# Patient Record
Sex: MALE | Race: OTHER | NOT HISPANIC OR LATINO
[De-identification: names, ages, dates, MRNs, and addresses within clinical notes are randomized per-mention and may not be internally consistent; named-entity substitution may affect disease eponyms.]

---

## 2019-08-22 PROBLEM — Z00.00 ENCOUNTER FOR PREVENTIVE HEALTH EXAMINATION: Status: ACTIVE | Noted: 2019-08-22

## 2019-09-17 ENCOUNTER — APPOINTMENT (OUTPATIENT)
Dept: PULMONOLOGY | Facility: CLINIC | Age: 64
End: 2019-09-17
Payer: COMMERCIAL

## 2019-09-17 DIAGNOSIS — E78.5 HYPERLIPIDEMIA, UNSPECIFIED: ICD-10-CM

## 2019-09-17 DIAGNOSIS — Z99.89 OBSTRUCTIVE SLEEP APNEA (ADULT) (PEDIATRIC): ICD-10-CM

## 2019-09-17 DIAGNOSIS — E03.9 HYPOTHYROIDISM, UNSPECIFIED: ICD-10-CM

## 2019-09-17 DIAGNOSIS — R06.83 SNORING: ICD-10-CM

## 2019-09-17 DIAGNOSIS — F51.04 PSYCHOPHYSIOLOGIC INSOMNIA: ICD-10-CM

## 2019-09-17 DIAGNOSIS — N40.0 BENIGN PROSTATIC HYPERPLASIA WITHOUT LOWER URINARY TRACT SYMPMS: ICD-10-CM

## 2019-09-17 DIAGNOSIS — G47.33 OBSTRUCTIVE SLEEP APNEA (ADULT) (PEDIATRIC): ICD-10-CM

## 2019-09-17 DIAGNOSIS — G47.26 CIRCADIAN RHYTHM SLEEP DISORDER, SHIFT WORK TYPE: ICD-10-CM

## 2019-09-17 LAB — POCT - HEMOGLOBIN (HGB), QUANTITATIVE, TRANSCUTANEOUS: 14.6

## 2019-09-17 PROCEDURE — 94750: CPT

## 2019-09-17 PROCEDURE — ZZZZZ: CPT

## 2019-09-17 PROCEDURE — 94729 DIFFUSING CAPACITY: CPT

## 2019-09-17 PROCEDURE — 88738 HGB QUANT TRANSCUTANEOUS: CPT

## 2019-09-17 PROCEDURE — 94060 EVALUATION OF WHEEZING: CPT

## 2019-09-17 PROCEDURE — 94726 PLETHYSMOGRAPHY LUNG VOLUMES: CPT

## 2019-09-17 PROCEDURE — 99204 OFFICE O/P NEW MOD 45 MIN: CPT | Mod: 25

## 2019-09-17 RX ORDER — ZALEPLON 10 MG/1
10 CAPSULE ORAL
Qty: 30 | Refills: 1 | Status: ACTIVE | COMMUNITY
Start: 2019-09-17 | End: 1900-01-01

## 2019-09-22 PROBLEM — G47.26 SHIFT WORK SLEEP DISORDER: Status: ACTIVE | Noted: 2019-09-22

## 2019-09-22 PROBLEM — N40.0 BPH (BENIGN PROSTATIC HYPERPLASIA): Status: ACTIVE | Noted: 2019-09-22

## 2019-09-22 PROBLEM — E78.5 HLD (HYPERLIPIDEMIA): Status: ACTIVE | Noted: 2019-09-22

## 2019-09-22 PROBLEM — E03.9 HYPOTHYROIDISM: Status: ACTIVE | Noted: 2019-09-22

## 2019-09-22 PROBLEM — F51.04 CHRONIC INSOMNIA: Status: ACTIVE | Noted: 2019-09-22

## 2019-09-22 RX ORDER — ALFUZOSIN HYDROCHLORIDE 10 MG/1
10 TABLET, EXTENDED RELEASE ORAL
Refills: 0 | Status: ACTIVE | COMMUNITY

## 2019-09-22 RX ORDER — ALPRAZOLAM 2 MG/1
TABLET ORAL
Refills: 0 | Status: ACTIVE | COMMUNITY

## 2019-09-22 RX ORDER — FINASTERIDE 5 MG/1
5 TABLET, FILM COATED ORAL
Refills: 0 | Status: ACTIVE | COMMUNITY

## 2019-09-22 RX ORDER — LEVOTHYROXINE SODIUM 125 MCG
125 TABLET ORAL
Refills: 0 | Status: ACTIVE | COMMUNITY

## 2019-09-22 RX ORDER — GABAPENTIN 100 MG/1
100 CAPSULE ORAL
Refills: 0 | Status: ACTIVE | COMMUNITY

## 2019-09-22 RX ORDER — GEMFIBROZIL 600 MG/1
600 TABLET, FILM COATED ORAL
Refills: 0 | Status: ACTIVE | COMMUNITY

## 2019-09-22 NOTE — HISTORY OF PRESENT ILLNESS
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 His sleep time shifts by 12 hours every week.\par He has been using medication to sleep but it is been not fully effective for him.\par He also feels he is awake on CPAP\par Xanax and alprazolam were somewhat effective for him.\par \par

## 2019-09-22 NOTE — ASSESSMENT
[FreeTextEntry1] : Sleep issues appear multifactorial including\par Sleep stage misperception\par Shift work\par Possible anxiety\par Medication dependence.\par I explained to the patient that I think he is making an unreasonable demands on his body. It is not reasonable to expect him to be able to shift his schedule so dramatically.\par The most I can offer him is short acting medications to initiate sleep. \par Longer acting medications will make it difficult for him to be alert at times when he needs to be to work.\par He needs to set reasonable goals about scheduling.\par Additional sleep testing will likely be helpful once Shift work issue has been addressed

## 2019-10-23 ENCOUNTER — RX RENEWAL (OUTPATIENT)
Age: 64
End: 2019-10-23

## 2019-10-23 RX ORDER — ZOLPIDEM TARTRATE 10 MG/1
10 TABLET ORAL
Qty: 30 | Refills: 2 | Status: ACTIVE | COMMUNITY
Start: 2019-10-23 | End: 1900-01-01

## 2020-10-11 ENCOUNTER — TRANSCRIPTION ENCOUNTER (OUTPATIENT)
Age: 65
End: 2020-10-11

## 2022-08-02 ENCOUNTER — NON-APPOINTMENT (OUTPATIENT)
Age: 67
End: 2022-08-02

## 2022-08-10 ENCOUNTER — APPOINTMENT (OUTPATIENT)
Dept: ORTHOPEDIC SURGERY | Facility: CLINIC | Age: 67
End: 2022-08-10

## 2022-08-10 VITALS — HEIGHT: 70 IN | WEIGHT: 297 LBS | BODY MASS INDEX: 42.52 KG/M2

## 2022-08-10 PROCEDURE — 73502 X-RAY EXAM HIP UNI 2-3 VIEWS: CPT | Mod: LT

## 2022-08-10 PROCEDURE — 99204 OFFICE O/P NEW MOD 45 MIN: CPT

## 2022-08-10 NOTE — DISCUSSION/SUMMARY
[de-identified] : Impression is difficult to determine the dominant etiology of patient's symptoms gluteus tendon lesion versus hip osteoarthritis.  Versus adductor origin lesion.\par Plan trial of x-ray guided Marcaine injection left hip before considering possible steroid injection gluteus tendon

## 2022-08-10 NOTE — PHYSICAL EXAM
[de-identified] : Constitutional:Well nourished , well developed and in no acute distress\par Psychiatric: Alert and oriented to time place and person.Appropriate affect \par Skin:Head, neck, arms and lower extremities:no lesions or discoloration\par HEENT: Normocephalic, EOM intact, Nasal septum midline,\par Respiratory: Unlabored respirations,no audible wheezing ,no tachypnea, no cyanosis\par Cardiovascular: no leg swelling  no ankle edema no JVD, pulse regular\par Vascular: no calf or thigh tenderness, \par Peripheral pulses; intact\par Lymphatics:No groin adenopathy,no lymphedema lower  or upper extremities\par Left hip tender greater trochanter Trendelenburg gait leg length equal passive motion pain-free flexion 110 internal 30 external 50 [de-identified] : X-ray AP pelvis left hip reveals superolateral joint space narrowing and cam lesions\par \par MRI left hip of May 31, 2022 reveals severe left hip arthrosis, diffuse tear labrum low-grade partial-thickness tear distal gluteus minimus tendon and left common adductor tendon origin

## 2022-08-10 NOTE — HISTORY OF PRESENT ILLNESS
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  4 to 5 months ago patient developed spontaneous onset of pain lateral aspect left hip medial aspect groin and in the mid buttock.  Groin pain is constant.  Most severe pain is in the lateral hip which is markedly aggravated by sitting when 5 minutes.  He is relatively less symptomatic when walking.  He is undergone orthopedic evaluation.  Prescribed anti-inflammatory medication did not relieve symptoms nor did a 6 session course of physical therapy.  He obtained some relief from use of Aleve and Biofreeze.  Is ambulating independently.

## 2022-08-24 ENCOUNTER — TRANSCRIPTION ENCOUNTER (OUTPATIENT)
Age: 67
End: 2022-08-24

## 2022-08-31 ENCOUNTER — APPOINTMENT (OUTPATIENT)
Dept: ORTHOPEDIC SURGERY | Facility: CLINIC | Age: 67
End: 2022-08-31

## 2022-08-31 DIAGNOSIS — M17.12 UNILATERAL PRIMARY OSTEOARTHRITIS, LEFT KNEE: ICD-10-CM

## 2022-08-31 PROCEDURE — 99213 OFFICE O/P EST LOW 20 MIN: CPT

## 2022-08-31 NOTE — HISTORY OF PRESENT ILLNESS
[de-identified] : Deepak is a 66 year old male who presents for follow up of left hip Marcaine injection under fluoroscopy. He had the injection 2 weeks ago .  Patient reports following injection left hip preinjection left hip groin lateral hip and left knee pain resolved completely

## 2022-08-31 NOTE — PHYSICAL EXAM
[de-identified] : Constitutional:Well nourished , well developed and in no acute distress\par Psychiatric: Alert and oriented to time place and person.Appropriate affect \par Skin:Head, neck, arms and lower extremities:no lesions or discoloration\par HEENT: Normocephalic, EOM intact, Nasal septum midline,\par Respiratory: Unlabored respirations,no audible wheezing ,no tachypnea, no cyanosis\par Cardiovascular: no leg swelling  no ankle edema no JVD, pulse regular\par Vascular: no calf or thigh tenderness, \par Peripheral pulses; intact\par Lymphatics:No groin adenopathy,no lymphedema lower  or upper extremities\par

## 2022-10-12 ENCOUNTER — APPOINTMENT (OUTPATIENT)
Dept: ORTHOPEDIC SURGERY | Facility: CLINIC | Age: 67
End: 2022-10-12

## 2022-10-12 DIAGNOSIS — M16.11 UNILATERAL PRIMARY OSTEOARTHRITIS, RIGHT HIP: ICD-10-CM

## 2022-10-12 PROCEDURE — 99214 OFFICE O/P EST MOD 30 MIN: CPT

## 2022-10-12 NOTE — HISTORY OF PRESENT ILLNESS
[de-identified] : Deepak is a 66 year old male who presents for follow up of left hip Marcaine injection under fluoroscopy. He had the injection 2 weeks ago .  Patient reports following injection left hip preinjection left hip groin lateral hip and left knee pain resolved completely.  He now complains of recurrent pain with symptoms provoked by sitting or walking more than 10 minutes.  Overall feels quality life is markedly compromised

## 2022-10-12 NOTE — DISCUSSION/SUMMARY
[de-identified] : Impression end-stage osteoarthritis left hip\par Recommendation given compromised quality life I recommended to patient and wife total hip replacement and reviewed risk benefits and alternatives

## 2022-10-12 NOTE — PHYSICAL EXAM
[de-identified] : Constitutional:Well nourished , well developed and in no acute distress\par Psychiatric: Alert and oriented to time place and person.Appropriate affect \par Skin:Head, neck, arms and lower extremities:no lesions or discoloration\par HEENT: Normocephalic, EOM intact, Nasal septum midline,\par Respiratory: Unlabored respirations,no audible wheezing ,no tachypnea, no cyanosis\par Cardiovascular: no leg swelling  no ankle edema no JVD, pulse regular\par Vascular: no calf or thigh tenderness, \par Peripheral pulses; intact\par Lymphatics:No groin adenopathy,no lymphedema lower  or upper extremities\par Left hip passive range of motion restricted and painful [de-identified] : X-ray AP pelvis left hip reveals superolateral joint space narrowing and cam lesions\par \par MRI left hip of May 31, 2022 reveals severe left hip arthrosis, diffuse tear labrum low-grade partial-thickness tear distal gluteus minimus tendon and left common adductor tendon origin

## 2022-11-29 ENCOUNTER — OUTPATIENT (OUTPATIENT)
Dept: OUTPATIENT SERVICES | Facility: HOSPITAL | Age: 67
LOS: 1 days | End: 2022-11-29
Payer: COMMERCIAL

## 2022-11-29 VITALS
DIASTOLIC BLOOD PRESSURE: 76 MMHG | TEMPERATURE: 98 F | HEART RATE: 60 BPM | RESPIRATION RATE: 14 BRPM | OXYGEN SATURATION: 99 % | SYSTOLIC BLOOD PRESSURE: 124 MMHG | HEIGHT: 70 IN | WEIGHT: 300.05 LBS

## 2022-11-29 DIAGNOSIS — M19.90 UNSPECIFIED OSTEOARTHRITIS, UNSPECIFIED SITE: ICD-10-CM

## 2022-11-29 DIAGNOSIS — M16.12 UNILATERAL PRIMARY OSTEOARTHRITIS, LEFT HIP: ICD-10-CM

## 2022-11-29 DIAGNOSIS — Z01.818 ENCOUNTER FOR OTHER PREPROCEDURAL EXAMINATION: ICD-10-CM

## 2022-11-29 LAB
ALBUMIN SERPL ELPH-MCNC: 3.5 G/DL — SIGNIFICANT CHANGE UP (ref 3.3–5)
ALLERGY+IMMUNOLOGY DIAG STUDY NOTE: SIGNIFICANT CHANGE UP
ALP SERPL-CCNC: 72 U/L — SIGNIFICANT CHANGE UP (ref 30–120)
ALT FLD-CCNC: 21 U/L DA — SIGNIFICANT CHANGE UP (ref 10–60)
ANION GAP SERPL CALC-SCNC: 8 MMOL/L — SIGNIFICANT CHANGE UP (ref 5–17)
APTT BLD: 35.9 SEC — HIGH (ref 27.5–35.5)
AST SERPL-CCNC: 13 U/L — SIGNIFICANT CHANGE UP (ref 10–40)
BILIRUB SERPL-MCNC: 0.4 MG/DL — SIGNIFICANT CHANGE UP (ref 0.2–1.2)
BLD GP AB SCN SERPL QL: SIGNIFICANT CHANGE UP
BUN SERPL-MCNC: 17 MG/DL — SIGNIFICANT CHANGE UP (ref 7–23)
CALCIUM SERPL-MCNC: 8.5 MG/DL — SIGNIFICANT CHANGE UP (ref 8.4–10.5)
CHLORIDE SERPL-SCNC: 107 MMOL/L — SIGNIFICANT CHANGE UP (ref 96–108)
CO2 SERPL-SCNC: 29 MMOL/L — SIGNIFICANT CHANGE UP (ref 22–31)
CREAT SERPL-MCNC: 0.96 MG/DL — SIGNIFICANT CHANGE UP (ref 0.5–1.3)
DAT IGG-SP REAG RBC-IMP: ABNORMAL
DIR ANTIGLOB POLYSPECIFIC INTERPRETATION: ABNORMAL
EGFR: 87 ML/MIN/1.73M2 — SIGNIFICANT CHANGE UP
GLUCOSE SERPL-MCNC: 110 MG/DL — HIGH (ref 70–99)
HCT VFR BLD CALC: 43.4 % — SIGNIFICANT CHANGE UP (ref 39–50)
HGB BLD-MCNC: 14.8 G/DL — SIGNIFICANT CHANGE UP (ref 13–17)
IAT COMP-SP REAG SERPL QL: SIGNIFICANT CHANGE UP
INR BLD: 1.12 RATIO — SIGNIFICANT CHANGE UP (ref 0.88–1.16)
MCHC RBC-ENTMCNC: 31.1 PG — SIGNIFICANT CHANGE UP (ref 27–34)
MCHC RBC-ENTMCNC: 34.1 GM/DL — SIGNIFICANT CHANGE UP (ref 32–36)
MCV RBC AUTO: 91.2 FL — SIGNIFICANT CHANGE UP (ref 80–100)
NRBC # BLD: 0 /100 WBCS — SIGNIFICANT CHANGE UP (ref 0–0)
PLATELET # BLD AUTO: 210 K/UL — SIGNIFICANT CHANGE UP (ref 150–400)
POTASSIUM SERPL-MCNC: 3.8 MMOL/L — SIGNIFICANT CHANGE UP (ref 3.5–5.3)
POTASSIUM SERPL-SCNC: 3.8 MMOL/L — SIGNIFICANT CHANGE UP (ref 3.5–5.3)
PROT SERPL-MCNC: 7.6 G/DL — SIGNIFICANT CHANGE UP (ref 6–8.3)
PROTHROM AB SERPL-ACNC: 12.9 SEC — SIGNIFICANT CHANGE UP (ref 10.5–13.4)
RBC # BLD: 4.76 M/UL — SIGNIFICANT CHANGE UP (ref 4.2–5.8)
RBC # FLD: 13.5 % — SIGNIFICANT CHANGE UP (ref 10.3–14.5)
SODIUM SERPL-SCNC: 144 MMOL/L — SIGNIFICANT CHANGE UP (ref 135–145)
WBC # BLD: 8.11 K/UL — SIGNIFICANT CHANGE UP (ref 3.8–10.5)
WBC # FLD AUTO: 8.11 K/UL — SIGNIFICANT CHANGE UP (ref 3.8–10.5)

## 2022-11-29 PROCEDURE — 86077 PHYS BLOOD BANK SERV XMATCH: CPT

## 2022-11-29 PROCEDURE — 93010 ELECTROCARDIOGRAM REPORT: CPT

## 2022-11-29 PROCEDURE — 93005 ELECTROCARDIOGRAM TRACING: CPT

## 2022-11-29 PROCEDURE — G0463: CPT

## 2022-11-29 NOTE — H&P PST ADULT - HISTORY OF PRESENT ILLNESS
This jannie 68 y/o male who presents with 9 month history of worsening left hip pain/groin  radiating to buttocks. and down to left knee . Reports constant dull sharp pain and increased pain when sitting, bending hip and knee  with pain scale 8/10 . Naproxen PRN for pain  scheduled for left hip replacement  on 12/13/22 This is a 66 y/o male who presents with 9 month history of worsening left hip /groin pain radiating to buttocks and down to left knee . Reports constant dull sharp pain and increased pain when sitting, bending hip and knee  with pain scale 8/10 . Naproxen PRN for pain  scheduled for left hip replacement  on 12/13/22

## 2022-11-29 NOTE — H&P PST ADULT - MUSCULOSKELETAL COMMENTS
left hip pain left hip tenderness left hip tenderness. pain with ROM left hip ; skin dry and intact , no lesions

## 2022-11-29 NOTE — H&P PST ADULT - PROBLEM SELECTOR PROBLEM 1
Breathing spontaneous and unlabored. Breath sounds clear and equal bilaterally with regular rhythm. Osteoarthritis

## 2022-11-29 NOTE — H&P PST ADULT - MUSCULOSKELETAL
no chest wall tenderness details… left hip/decreased ROM/decreased ROM due to pain/decreased strength

## 2022-11-29 NOTE — H&P PST ADULT - PROBLEM SELECTOR PLAN 1
scheduled for left hip replacement on 12/13/22   Will obtain medical , carda]=iac and pulmonary clearance scheduled for left hip replacement on 12/13/22   Will obtain medical , cardiac and pulmonary clearance  Pre op instructions on wash , and medications   COVID test on 12/11/22 at 8 am

## 2022-11-29 NOTE — H&P PST ADULT - NSICDXPASTMEDICALHX_GEN_ALL_CORE_FT
PAST MEDICAL HISTORY:  Atonic bladder self catheterize 3 x day    BPH (benign prostatic hyperplasia)     HLD (hyperlipidemia)     Hypothyroid     Insomnia     Neuropathy bilateral feet    PRATIMA on CPAP     Osteoarthritis of left hip      PAST MEDICAL HISTORY:  Atonic bladder self catheterize 3 x day    BPH (benign prostatic hyperplasia)     HLD (hyperlipidemia)     Hypothyroid     Insomnia     Morbid obesity with BMI of 40.0-44.9, adult BMI 43    Neuropathy bilateral feet    PRATIMA on CPAP     Osteoarthritis of left hip

## 2022-11-30 LAB
A1C WITH ESTIMATED AVERAGE GLUCOSE RESULT: 5.6 % — SIGNIFICANT CHANGE UP (ref 4–5.6)
ESTIMATED AVERAGE GLUCOSE: 114 MG/DL — SIGNIFICANT CHANGE UP (ref 68–114)
MRSA PCR RESULT.: SIGNIFICANT CHANGE UP
S AUREUS DNA NOSE QL NAA+PROBE: SIGNIFICANT CHANGE UP

## 2022-12-06 ENCOUNTER — APPOINTMENT (OUTPATIENT)
Dept: RADIATION ONCOLOGY | Facility: CLINIC | Age: 67
End: 2022-12-06

## 2022-12-06 ENCOUNTER — OUTPATIENT (OUTPATIENT)
Dept: OUTPATIENT SERVICES | Facility: HOSPITAL | Age: 67
LOS: 1 days | Discharge: HOME | End: 2022-12-06

## 2022-12-06 DIAGNOSIS — M16.12 UNILATERAL PRIMARY OSTEOARTHRITIS, LEFT HIP: ICD-10-CM

## 2022-12-06 PROBLEM — N40.0 BENIGN PROSTATIC HYPERPLASIA WITHOUT LOWER URINARY TRACT SYMPTOMS: Chronic | Status: ACTIVE | Noted: 2022-11-29

## 2022-12-06 PROBLEM — N31.2 FLACCID NEUROPATHIC BLADDER, NOT ELSEWHERE CLASSIFIED: Chronic | Status: ACTIVE | Noted: 2022-11-29

## 2022-12-06 PROBLEM — E78.5 HYPERLIPIDEMIA, UNSPECIFIED: Chronic | Status: ACTIVE | Noted: 2022-11-29

## 2022-12-06 PROBLEM — G47.00 INSOMNIA, UNSPECIFIED: Chronic | Status: ACTIVE | Noted: 2022-11-29

## 2022-12-06 PROBLEM — E66.01 MORBID (SEVERE) OBESITY DUE TO EXCESS CALORIES: Chronic | Status: ACTIVE | Noted: 2022-11-29

## 2022-12-06 PROBLEM — E03.9 HYPOTHYROIDISM, UNSPECIFIED: Chronic | Status: ACTIVE | Noted: 2022-11-29

## 2022-12-06 PROBLEM — G47.33 OBSTRUCTIVE SLEEP APNEA (ADULT) (PEDIATRIC): Chronic | Status: ACTIVE | Noted: 2022-11-29

## 2022-12-06 PROBLEM — G62.9 POLYNEUROPATHY, UNSPECIFIED: Chronic | Status: ACTIVE | Noted: 2022-11-29

## 2022-12-06 LAB — APTT BLD: 34.6 SEC — SIGNIFICANT CHANGE UP (ref 27.5–35.5)

## 2022-12-06 PROCEDURE — 99024 POSTOP FOLLOW-UP VISIT: CPT

## 2022-12-06 PROCEDURE — 77261 THER RADIOLOGY TX PLNG SMPL: CPT

## 2022-12-06 NOTE — DISEASE MANAGEMENT
[Clinical] : TNM Stage: c [N/A] : Currently not applicable [FreeTextEntry4] : n/a [TTNM] : - [NTNM] : - [MTNM] : -

## 2022-12-06 NOTE — PHYSICAL EXAM
[General Appearance - Well Developed] : well developed [General Appearance - Well Nourished] : well nourished [General Appearance - Alert] : alert [General Appearance - In No Acute Distress] : in no acute distress [Sclera] : the sclera and conjunctiva were normal [Extraocular Movements] : extraocular movements were intact [Outer Ear] : the ears and nose were normal in appearance [Hearing Threshold Finger Rub Not Chase] : hearing was normal [] : no respiratory distress [Respiration, Rhythm And Depth] : normal respiratory rhythm and effort [Exaggerated Use Of Accessory Muscles For Inspiration] : no accessory muscle use [No Focal Deficits] : no focal deficits [Oriented To Time, Place, And Person] : oriented to person, place, and time [Affect] : the affect was normal [Not Anxious] : not anxious [FreeTextEntry1] : limited; video telehealth

## 2022-12-06 NOTE — VITALS
[Maximal Pain Intensity: 6/10] : 6/10 [Least Pain Intensity: 2/10] : 2/10 [Pain Location: ___] : Pain Location: [unfilled] [80: Normal activity with effort; some signs or symptoms of disease.] : 80: Normal activity with effort; some signs or symptoms of disease.  [ECOG Performance Status: 1 - Restricted in physically strenuous activity but ambulatory and able to carry out work of a light or sedentary nature] : Performance Status: 1 - Restricted in physically strenuous activity but ambulatory and able to carry out work of a light or sedentary nature, e.g., light house work, office work

## 2022-12-06 NOTE — HISTORY OF PRESENT ILLNESS
[FreeTextEntry1] : Mr. Porter is a 67 year old male with primary osteoarthritis of the left hip, with recurrent pain provoked by sitting or walking more than 10 minutes, c/w end stage osteoarthritis, for which he is scheduled for left total hip replacement. \par \par MR left hip on 5/31/22 had shown severe left hip OA with labral tearing, full-thickness cartilage loss. \par He reported immediate resolution of the pain s/p marcaine injection under fluoroscopy 8/18/22, but this relief is transient. As such, he is proceeding with left total hip replacement. 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 Notes significant left hip pain that prevents him from getting around.

## 2022-12-06 NOTE — REASON FOR VISIT
[Consideration of Therapy for Benign Disease] : consideration of therapy for benign disease [Other: ___] : [unfilled] [Home] : at home, [unfilled] , at the time of the visit. [Medical Office: ([facility])___] : at the medical office located in  [Verbal consent obtained from patient] : the patient, [unfilled]

## 2022-12-07 ENCOUNTER — APPOINTMENT (OUTPATIENT)
Dept: ORTHOPEDIC SURGERY | Facility: CLINIC | Age: 67
End: 2022-12-07

## 2022-12-07 VITALS — BODY MASS INDEX: 42.95 KG/M2 | HEIGHT: 70 IN | WEIGHT: 300 LBS

## 2022-12-07 DIAGNOSIS — M16.12 UNILATERAL PRIMARY OSTEOARTHRITIS, LEFT HIP: ICD-10-CM

## 2022-12-07 PROCEDURE — 99213 OFFICE O/P EST LOW 20 MIN: CPT

## 2022-12-07 NOTE — DISCUSSION/SUMMARY
[de-identified] : Impression end-stage osteoarthritis left hip\par Recommendation given compromised quality life I recommended to patient and wife total hip replacement and reviewed risk benefits and alternatives

## 2022-12-07 NOTE — HISTORY OF PRESENT ILLNESS
[de-identified] : Deepak is a 67 year old male who presents for follow up of left hip pain. He had a marcaine injection under fluoroscopy in the end of September, which he stated helped the pain. However the pain returned and became constant. The pain is provoked by sitting or walking more than 10 minutes. He feels quality life is markedly compromised. He has opted for surgery, and is scheduled for Left anterior THR for 12/13/22. Today, he is here for pre-op visit and skin check.  \par

## 2022-12-07 NOTE — PHYSICAL EXAM
[de-identified] : Constitutional:Well nourished , well developed and in no acute distress\par Psychiatric: Alert and oriented to time place and person.Appropriate affect \par Skin:Head, neck, arms and lower extremities:no lesions or discoloration\par HEENT: Normocephalic, EOM intact, Nasal septum midline,\par Respiratory: Unlabored respirations,no audible wheezing ,no tachypnea, no cyanosis\par Cardiovascular: no leg swelling no ankle edema no JVD, pulse regular\par Vascular: no calf or thigh tenderness, \par Peripheral pulses; intact\par Lymphatics:No groin adenopathy,no lymphedema lower or upper extremities\par Left hip passive range of motion restricted and painful  Skin intact\par  [de-identified] : RX-ray AP pelvis left hip reveals superolateral joint space narrowing and cam lesions  MRI left hip of May 31, 2022 reveals severe left hip arthrosis, diffuse tear labrum low-grade partial-thickness tear distal gluteus minimus tendon and left common adductor tendon origin

## 2022-12-08 RX ORDER — OXYCODONE HYDROCHLORIDE 5 MG/1
5 TABLET ORAL
Refills: 0 | Status: DISCONTINUED | OUTPATIENT
Start: 2022-12-13 | End: 2022-12-14

## 2022-12-08 RX ORDER — MAGNESIUM HYDROXIDE 400 MG/1
30 TABLET, CHEWABLE ORAL DAILY
Refills: 0 | Status: DISCONTINUED | OUTPATIENT
Start: 2022-12-13 | End: 2022-12-27

## 2022-12-08 RX ORDER — OXYCODONE HYDROCHLORIDE 5 MG/1
10 TABLET ORAL
Refills: 0 | Status: DISCONTINUED | OUTPATIENT
Start: 2022-12-13 | End: 2022-12-13

## 2022-12-08 RX ORDER — ACETAMINOPHEN 500 MG
1000 TABLET ORAL EVERY 8 HOURS
Refills: 0 | Status: DISCONTINUED | OUTPATIENT
Start: 2022-12-13 | End: 2022-12-27

## 2022-12-08 RX ORDER — SENNA PLUS 8.6 MG/1
2 TABLET ORAL AT BEDTIME
Refills: 0 | Status: DISCONTINUED | OUTPATIENT
Start: 2022-12-13 | End: 2022-12-27

## 2022-12-08 RX ORDER — POLYETHYLENE GLYCOL 3350 17 G/17G
17 POWDER, FOR SOLUTION ORAL AT BEDTIME
Refills: 0 | Status: DISCONTINUED | OUTPATIENT
Start: 2022-12-13 | End: 2022-12-27

## 2022-12-08 RX ORDER — SODIUM CHLORIDE 9 MG/ML
1000 INJECTION, SOLUTION INTRAVENOUS
Refills: 0 | Status: DISCONTINUED | OUTPATIENT
Start: 2022-12-13 | End: 2022-12-27

## 2022-12-08 RX ORDER — SODIUM CHLORIDE 9 MG/ML
500 INJECTION INTRAMUSCULAR; INTRAVENOUS; SUBCUTANEOUS ONCE
Refills: 0 | Status: COMPLETED | OUTPATIENT
Start: 2022-12-13 | End: 2022-12-13

## 2022-12-08 RX ORDER — ONDANSETRON 8 MG/1
4 TABLET, FILM COATED ORAL EVERY 6 HOURS
Refills: 0 | Status: DISCONTINUED | OUTPATIENT
Start: 2022-12-13 | End: 2022-12-27

## 2022-12-08 RX ORDER — PANTOPRAZOLE SODIUM 20 MG/1
40 TABLET, DELAYED RELEASE ORAL
Refills: 0 | Status: DISCONTINUED | OUTPATIENT
Start: 2022-12-13 | End: 2022-12-27

## 2022-12-11 ENCOUNTER — OUTPATIENT (OUTPATIENT)
Dept: OUTPATIENT SERVICES | Facility: HOSPITAL | Age: 67
LOS: 1 days | End: 2022-12-11
Payer: COMMERCIAL

## 2022-12-11 DIAGNOSIS — Z20.828 CONTACT WITH AND (SUSPECTED) EXPOSURE TO OTHER VIRAL COMMUNICABLE DISEASES: ICD-10-CM

## 2022-12-11 LAB — SARS-COV-2 RNA SPEC QL NAA+PROBE: SIGNIFICANT CHANGE UP

## 2022-12-11 PROCEDURE — U0005: CPT

## 2022-12-11 PROCEDURE — U0003: CPT

## 2022-12-12 ENCOUNTER — TRANSCRIPTION ENCOUNTER (OUTPATIENT)
Age: 67
End: 2022-12-12

## 2022-12-12 ENCOUNTER — OUTPATIENT (OUTPATIENT)
Dept: OUTPATIENT SERVICES | Facility: HOSPITAL | Age: 67
LOS: 1 days | Discharge: HOME | End: 2022-12-12

## 2022-12-12 PROCEDURE — 77300 RADIATION THERAPY DOSE PLAN: CPT | Mod: 26

## 2022-12-12 PROCEDURE — 77431 RADIATION THERAPY MANAGEMENT: CPT

## 2022-12-12 PROCEDURE — 77280 THER RAD SIMULAJ FIELD SMPL: CPT | Mod: 26

## 2022-12-13 ENCOUNTER — TRANSCRIPTION ENCOUNTER (OUTPATIENT)
Age: 67
End: 2022-12-13

## 2022-12-13 ENCOUNTER — APPOINTMENT (OUTPATIENT)
Dept: ORTHOPEDIC SURGERY | Facility: HOSPITAL | Age: 67
End: 2022-12-13

## 2022-12-13 ENCOUNTER — OUTPATIENT (OUTPATIENT)
Dept: OUTPATIENT SERVICES | Facility: HOSPITAL | Age: 67
LOS: 1 days | End: 2022-12-13
Payer: COMMERCIAL

## 2022-12-13 VITALS
SYSTOLIC BLOOD PRESSURE: 140 MMHG | HEART RATE: 57 BPM | DIASTOLIC BLOOD PRESSURE: 81 MMHG | TEMPERATURE: 99 F | WEIGHT: 297.18 LBS | OXYGEN SATURATION: 96 % | RESPIRATION RATE: 20 BRPM | HEIGHT: 70 IN

## 2022-12-13 DIAGNOSIS — M16.12 UNILATERAL PRIMARY OSTEOARTHRITIS, LEFT HIP: ICD-10-CM

## 2022-12-13 LAB
ANION GAP SERPL CALC-SCNC: 10 MMOL/L — SIGNIFICANT CHANGE UP (ref 5–17)
BUN SERPL-MCNC: 16 MG/DL — SIGNIFICANT CHANGE UP (ref 7–23)
CALCIUM SERPL-MCNC: 7.2 MG/DL — LOW (ref 8.4–10.5)
CHLORIDE SERPL-SCNC: 106 MMOL/L — SIGNIFICANT CHANGE UP (ref 96–108)
CO2 SERPL-SCNC: 24 MMOL/L — SIGNIFICANT CHANGE UP (ref 22–31)
CREAT SERPL-MCNC: 1.24 MG/DL — SIGNIFICANT CHANGE UP (ref 0.5–1.3)
EGFR: 64 ML/MIN/1.73M2 — SIGNIFICANT CHANGE UP
GLUCOSE SERPL-MCNC: 170 MG/DL — HIGH (ref 70–99)
HCT VFR BLD CALC: 35.8 % — LOW (ref 39–50)
HGB BLD-MCNC: 12 G/DL — LOW (ref 13–17)
MCHC RBC-ENTMCNC: 31.3 PG — SIGNIFICANT CHANGE UP (ref 27–34)
MCHC RBC-ENTMCNC: 33.5 GM/DL — SIGNIFICANT CHANGE UP (ref 32–36)
MCV RBC AUTO: 93.2 FL — SIGNIFICANT CHANGE UP (ref 80–100)
NRBC # BLD: 0 /100 WBCS — SIGNIFICANT CHANGE UP (ref 0–0)
PLATELET # BLD AUTO: 184 K/UL — SIGNIFICANT CHANGE UP (ref 150–400)
POTASSIUM SERPL-MCNC: 4.7 MMOL/L — SIGNIFICANT CHANGE UP (ref 3.5–5.3)
POTASSIUM SERPL-SCNC: 4.7 MMOL/L — SIGNIFICANT CHANGE UP (ref 3.5–5.3)
RBC # BLD: 3.84 M/UL — LOW (ref 4.2–5.8)
RBC # FLD: 13.2 % — SIGNIFICANT CHANGE UP (ref 10.3–14.5)
SODIUM SERPL-SCNC: 140 MMOL/L — SIGNIFICANT CHANGE UP (ref 135–145)
WBC # BLD: 14.91 K/UL — HIGH (ref 3.8–10.5)
WBC # FLD AUTO: 14.91 K/UL — HIGH (ref 3.8–10.5)

## 2022-12-13 PROCEDURE — 99232 SBSQ HOSP IP/OBS MODERATE 35: CPT

## 2022-12-13 PROCEDURE — 27130 TOTAL HIP ARTHROPLASTY: CPT | Mod: LT

## 2022-12-13 DEVICE — LIGATING CLIPS WECK HORIZON MEDIUM (BLUE) 6: Type: IMPLANTABLE DEVICE | Site: LEFT | Status: FUNCTIONAL

## 2022-12-13 DEVICE — HEAD FEM CERAMIC 12/14 36MM PLUS 5: Type: IMPLANTABLE DEVICE | Site: LEFT | Status: FUNCTIONAL

## 2022-12-13 DEVICE — LIGATING CLIPS WECK HORIZON MEDIUM-LARGE (GREEN) 6: Type: IMPLANTABLE DEVICE | Site: LEFT | Status: FUNCTIONAL

## 2022-12-13 DEVICE — ROD REAMING W/BALL TIP 2.5X650MM STRL: Type: IMPLANTABLE DEVICE | Site: LEFT | Status: FUNCTIONAL

## 2022-12-13 DEVICE — LIGATING CLIPS WECK HORIZON LARGE (ORANGE) 6: Type: IMPLANTABLE DEVICE | Site: LEFT | Status: FUNCTIONAL

## 2022-12-13 DEVICE — SHELL ACET PINNACLE SECTOR W/ GRIPTION 56MM: Type: IMPLANTABLE DEVICE | Site: LEFT | Status: FUNCTIONAL

## 2022-12-13 DEVICE — PINNACLE ALTRX NEUTRAL36 X 56: Type: IMPLANTABLE DEVICE | Site: LEFT | Status: FUNCTIONAL

## 2022-12-13 DEVICE — BONE WAX 2.5GM: Type: IMPLANTABLE DEVICE | Site: LEFT | Status: FUNCTIONAL

## 2022-12-13 DEVICE — BLLN PTA ANGIOSCULPT 4X40: Type: IMPLANTABLE DEVICE | Site: LEFT | Status: FUNCTIONAL

## 2022-12-13 DEVICE — COIL NIT OCCLD PDA 6X5MM: Type: IMPLANTABLE DEVICE | Site: LEFT | Status: FUNCTIONAL

## 2022-12-13 DEVICE — IMPLANTABLE DEVICE: Type: IMPLANTABLE DEVICE | Site: LEFT | Status: FUNCTIONAL

## 2022-12-13 RX ORDER — TAMSULOSIN HYDROCHLORIDE 0.4 MG/1
0.8 CAPSULE ORAL DAILY
Refills: 0 | Status: DISCONTINUED | OUTPATIENT
Start: 2022-12-13 | End: 2022-12-27

## 2022-12-13 RX ORDER — ALFUZOSIN HYDROCHLORIDE 10 MG/1
1 TABLET, EXTENDED RELEASE ORAL
Qty: 0 | Refills: 0 | DISCHARGE

## 2022-12-13 RX ORDER — SENNA PLUS 8.6 MG/1
2 TABLET ORAL
Qty: 0 | Refills: 0 | DISCHARGE
Start: 2022-12-13

## 2022-12-13 RX ORDER — LEVOTHYROXINE SODIUM 125 MCG
1 TABLET ORAL
Qty: 0 | Refills: 0 | DISCHARGE

## 2022-12-13 RX ORDER — OMEPRAZOLE 10 MG/1
1 CAPSULE, DELAYED RELEASE ORAL
Qty: 30 | Refills: 1
Start: 2022-12-13 | End: 2023-02-10

## 2022-12-13 RX ORDER — TRANEXAMIC ACID 100 MG/ML
1000 INJECTION, SOLUTION INTRAVENOUS ONCE
Refills: 0 | Status: COMPLETED | OUTPATIENT
Start: 2022-12-13 | End: 2022-12-13

## 2022-12-13 RX ORDER — CHLORHEXIDINE GLUCONATE 213 G/1000ML
1 SOLUTION TOPICAL ONCE
Refills: 0 | Status: COMPLETED | OUTPATIENT
Start: 2022-12-13 | End: 2022-12-13

## 2022-12-13 RX ORDER — GABAPENTIN 400 MG/1
200 CAPSULE ORAL DAILY
Refills: 0 | Status: DISCONTINUED | OUTPATIENT
Start: 2022-12-13 | End: 2022-12-27

## 2022-12-13 RX ORDER — GABAPENTIN 400 MG/1
2 CAPSULE ORAL
Qty: 0 | Refills: 0 | DISCHARGE

## 2022-12-13 RX ORDER — OXYCODONE HYDROCHLORIDE 5 MG/1
5 TABLET ORAL ONCE
Refills: 0 | Status: DISCONTINUED | OUTPATIENT
Start: 2022-12-13 | End: 2022-12-13

## 2022-12-13 RX ORDER — ACETAMINOPHEN 500 MG
1000 TABLET ORAL
Qty: 0 | Refills: 0 | DISCHARGE
Start: 2022-12-13

## 2022-12-13 RX ORDER — FINASTERIDE 5 MG/1
1 TABLET, FILM COATED ORAL
Qty: 0 | Refills: 0 | DISCHARGE

## 2022-12-13 RX ORDER — FINASTERIDE 5 MG/1
5 TABLET, FILM COATED ORAL DAILY
Refills: 0 | Status: DISCONTINUED | OUTPATIENT
Start: 2022-12-13 | End: 2022-12-27

## 2022-12-13 RX ORDER — VANCOMYCIN HCL 1 G
2000 VIAL (EA) INTRAVENOUS ONCE
Refills: 0 | Status: DISCONTINUED | OUTPATIENT
Start: 2022-12-13 | End: 2022-12-13

## 2022-12-13 RX ORDER — POLYETHYLENE GLYCOL 3350 17 G/17G
17 POWDER, FOR SOLUTION ORAL
Qty: 0 | Refills: 0 | DISCHARGE
Start: 2022-12-13

## 2022-12-13 RX ORDER — ACETAMINOPHEN 500 MG
1000 TABLET ORAL ONCE
Refills: 0 | Status: COMPLETED | OUTPATIENT
Start: 2022-12-13 | End: 2022-12-13

## 2022-12-13 RX ORDER — ONDANSETRON 8 MG/1
4 TABLET, FILM COATED ORAL ONCE
Refills: 0 | Status: DISCONTINUED | OUTPATIENT
Start: 2022-12-13 | End: 2022-12-13

## 2022-12-13 RX ORDER — GABAPENTIN 400 MG/1
0 CAPSULE ORAL
Qty: 0 | Refills: 0 | DISCHARGE

## 2022-12-13 RX ORDER — CEFAZOLIN SODIUM 1 G
3000 VIAL (EA) INJECTION ONCE
Refills: 0 | Status: DISCONTINUED | OUTPATIENT
Start: 2022-12-13 | End: 2022-12-13

## 2022-12-13 RX ORDER — HYDROMORPHONE HYDROCHLORIDE 2 MG/ML
0.5 INJECTION INTRAMUSCULAR; INTRAVENOUS; SUBCUTANEOUS
Refills: 0 | Status: DISCONTINUED | OUTPATIENT
Start: 2022-12-13 | End: 2022-12-13

## 2022-12-13 RX ORDER — GABAPENTIN 400 MG/1
1 CAPSULE ORAL
Qty: 0 | Refills: 0 | DISCHARGE

## 2022-12-13 RX ORDER — LEVOTHYROXINE SODIUM 125 MCG
125 TABLET ORAL DAILY
Refills: 0 | Status: DISCONTINUED | OUTPATIENT
Start: 2022-12-13 | End: 2022-12-13

## 2022-12-13 RX ORDER — GEMFIBROZIL 600 MG
600 TABLET ORAL
Refills: 0 | Status: DISCONTINUED | OUTPATIENT
Start: 2022-12-13 | End: 2022-12-27

## 2022-12-13 RX ORDER — GABAPENTIN 400 MG/1
100 CAPSULE ORAL AT BEDTIME
Refills: 0 | Status: DISCONTINUED | OUTPATIENT
Start: 2022-12-13 | End: 2022-12-27

## 2022-12-13 RX ORDER — GEMFIBROZIL 600 MG
1 TABLET ORAL
Qty: 0 | Refills: 0 | DISCHARGE

## 2022-12-13 RX ORDER — LEVOTHYROXINE SODIUM 125 MCG
2 TABLET ORAL
Qty: 0 | Refills: 0 | DISCHARGE

## 2022-12-13 RX ORDER — CELECOXIB 200 MG/1
200 CAPSULE ORAL EVERY 12 HOURS
Refills: 0 | Status: DISCONTINUED | OUTPATIENT
Start: 2022-12-13 | End: 2022-12-27

## 2022-12-13 RX ORDER — VANCOMYCIN HCL 1 G
2000 VIAL (EA) INTRAVENOUS ONCE
Refills: 0 | Status: COMPLETED | OUTPATIENT
Start: 2022-12-13 | End: 2022-12-13

## 2022-12-13 RX ORDER — LEVOTHYROXINE SODIUM 125 MCG
125 TABLET ORAL DAILY
Refills: 0 | Status: DISCONTINUED | OUTPATIENT
Start: 2022-12-13 | End: 2022-12-27

## 2022-12-13 RX ORDER — DEXAMETHASONE 0.5 MG/5ML
8 ELIXIR ORAL ONCE
Refills: 0 | Status: COMPLETED | OUTPATIENT
Start: 2022-12-14 | End: 2022-12-14

## 2022-12-13 RX ORDER — ZOLPIDEM TARTRATE 10 MG/1
5 TABLET ORAL AT BEDTIME
Refills: 0 | Status: DISCONTINUED | OUTPATIENT
Start: 2022-12-13 | End: 2022-12-13

## 2022-12-13 RX ORDER — ASPIRIN/CALCIUM CARB/MAGNESIUM 324 MG
81 TABLET ORAL EVERY 12 HOURS
Refills: 0 | Status: DISCONTINUED | OUTPATIENT
Start: 2022-12-14 | End: 2022-12-27

## 2022-12-13 RX ORDER — NALOXONE HYDROCHLORIDE 4 MG/.1ML
4 SPRAY NASAL
Qty: 1 | Refills: 0
Start: 2022-12-13

## 2022-12-13 RX ORDER — SODIUM CHLORIDE 9 MG/ML
1000 INJECTION, SOLUTION INTRAVENOUS
Refills: 0 | Status: DISCONTINUED | OUTPATIENT
Start: 2022-12-13 | End: 2022-12-13

## 2022-12-13 RX ORDER — SODIUM CHLORIDE 9 MG/ML
500 INJECTION INTRAMUSCULAR; INTRAVENOUS; SUBCUTANEOUS ONCE
Refills: 0 | Status: COMPLETED | OUTPATIENT
Start: 2022-12-13 | End: 2022-12-13

## 2022-12-13 RX ORDER — TOBRAMYCIN 0.3 %
1 DROPS OPHTHALMIC (EYE) EVERY 8 HOURS
Refills: 0 | Status: COMPLETED | OUTPATIENT
Start: 2022-12-13 | End: 2022-12-14

## 2022-12-13 RX ORDER — ASPIRIN/CALCIUM CARB/MAGNESIUM 324 MG
1 TABLET ORAL
Qty: 56 | Refills: 0
Start: 2022-12-13 | End: 2023-01-09

## 2022-12-13 RX ORDER — CEFAZOLIN SODIUM 1 G
2000 VIAL (EA) INJECTION EVERY 8 HOURS
Refills: 0 | Status: COMPLETED | OUTPATIENT
Start: 2022-12-13 | End: 2022-12-14

## 2022-12-13 RX ORDER — APREPITANT 80 MG/1
40 CAPSULE ORAL ONCE
Refills: 0 | Status: COMPLETED | OUTPATIENT
Start: 2022-12-13 | End: 2022-12-13

## 2022-12-13 RX ORDER — CELECOXIB 200 MG/1
1 CAPSULE ORAL
Qty: 60 | Refills: 0
Start: 2022-12-13 | End: 2023-01-11

## 2022-12-13 RX ORDER — ESZOPICLONE 2 MG/1
1 TABLET, COATED ORAL
Qty: 0 | Refills: 0 | DISCHARGE

## 2022-12-13 RX ADMIN — Medication 1000 MILLIGRAM(S): at 21:21

## 2022-12-13 RX ADMIN — OXYCODONE HYDROCHLORIDE 5 MILLIGRAM(S): 5 TABLET ORAL at 23:15

## 2022-12-13 RX ADMIN — CHLORHEXIDINE GLUCONATE 1 APPLICATION(S): 213 SOLUTION TOPICAL at 07:36

## 2022-12-13 RX ADMIN — Medication 1000 MILLIGRAM(S): at 21:18

## 2022-12-13 RX ADMIN — CELECOXIB 200 MILLIGRAM(S): 200 CAPSULE ORAL at 21:21

## 2022-12-13 RX ADMIN — APREPITANT 40 MILLIGRAM(S): 80 CAPSULE ORAL at 07:36

## 2022-12-13 RX ADMIN — OXYCODONE HYDROCHLORIDE 5 MILLIGRAM(S): 5 TABLET ORAL at 17:34

## 2022-12-13 RX ADMIN — Medication 250 MILLIGRAM(S): at 07:54

## 2022-12-13 RX ADMIN — GABAPENTIN 100 MILLIGRAM(S): 400 CAPSULE ORAL at 21:20

## 2022-12-13 RX ADMIN — Medication 100 MILLIGRAM(S): at 16:20

## 2022-12-13 RX ADMIN — Medication 1 DROP(S): at 14:45

## 2022-12-13 RX ADMIN — SODIUM CHLORIDE 125 MILLILITER(S): 9 INJECTION, SOLUTION INTRAVENOUS at 23:17

## 2022-12-13 RX ADMIN — SODIUM CHLORIDE 125 MILLILITER(S): 9 INJECTION, SOLUTION INTRAVENOUS at 16:19

## 2022-12-13 RX ADMIN — Medication 1 DROP(S): at 17:35

## 2022-12-13 RX ADMIN — Medication 1 DROP(S): at 14:50

## 2022-12-13 RX ADMIN — SODIUM CHLORIDE 75 MILLILITER(S): 9 INJECTION, SOLUTION INTRAVENOUS at 14:35

## 2022-12-13 RX ADMIN — Medication 600 MILLIGRAM(S): at 17:34

## 2022-12-13 RX ADMIN — Medication 400 MILLIGRAM(S): at 15:27

## 2022-12-13 RX ADMIN — SODIUM CHLORIDE 500 MILLILITER(S): 9 INJECTION INTRAMUSCULAR; INTRAVENOUS; SUBCUTANEOUS at 11:30

## 2022-12-13 RX ADMIN — CELECOXIB 200 MILLIGRAM(S): 200 CAPSULE ORAL at 21:18

## 2022-12-13 RX ADMIN — SODIUM CHLORIDE 500 MILLILITER(S): 9 INJECTION INTRAMUSCULAR; INTRAVENOUS; SUBCUTANEOUS at 16:20

## 2022-12-13 NOTE — CONSULT NOTE ADULT - SUBJECTIVE AND OBJECTIVE BOX
HPI: 67M who presents with 9 month history of worsening left hip /groin pain radiating to buttocks and down to left knee . Reports constant dull sharp pain and increased pain when sitting, bending hip and knee  with pain scale 8/10 . Naproxen PRN for pain  scheduled for left hip replacement on 12/13/22    Patient has some b/l eye pain but not blurriness. No loss of vision. No chest pain or shortness of breath. No nausea, vomiting, or abdominal pain.     REVIEW OF SYSTEMS:  CONSTITUTIONAL: No fever, weight loss, or fatigue  EYES: No eye pain, visual disturbances, or discharge  ENMT:  No difficulty hearing, tinnitus, vertigo; No sinus or throat pain  NECK: No pain or stiffness  RESPIRATORY: No cough, wheezing, chills or hemoptysis; No shortness of breath  CARDIOVASCULAR: No chest pain, palpitations, dizziness, or leg swelling  GASTROINTESTINAL: No abdominal or epigastric pain. No nausea, vomiting, or hematemesis; No diarrhea or constipation. No melena or hematochezia.  GENITOURINARY: No dysuria, frequency, hematuria, or incontinence  NEUROLOGICAL: No headaches, memory loss, loss of strength, numbness, or tremors  MUSCULOSKELETAL: No muscle or back pain      PAST MEDICAL & SURGICAL HISTORY:  Hypothyroid      HLD (hyperlipidemia)      Osteoarthritis of left hip      Neuropathy  bilateral feet      PRATIMA on CPAP      Insomnia      Atonic bladder  self catheterize 3 x day      BPH (benign prostatic hyperplasia)      Morbid obesity with BMI of 40.0-44.9, adult  BMI 43          SOCIAL HISTORY:  Tobacco; EtOH; Illicit Drugs    Allergies    cefdinir (Hives; Rash)  methenamine (Hives; Rash)    Intolerances        MEDICATIONS  (STANDING):  artificial tears (preservative free) Ophthalmic Solution 1 Drop(s) Both EYES every 4 hours  lactated ringers. 1000 milliLiter(s) (75 mL/Hr) IV Continuous <Continuous>  sodium chloride 0.9% Bolus 500 milliLiter(s) IV Bolus once  tetracaine 0.5% Solution 1 Drop(s) Both EYES once  tobramycin 0.3% Ophthalmic Solution 1 Drop(s) Both EYES every 8 hours  vancomycin  IVPB 2000 milliGRAM(s) IV Intermittent once    MEDICATIONS  (PRN):  HYDROmorphone  Injectable 0.5 milliGRAM(s) IV Push every 10 minutes PRN Moderate Pain (4 - 6)  ondansetron Injectable 4 milliGRAM(s) IV Push once PRN Nausea and/or Vomiting  oxyCODONE    IR 5 milliGRAM(s) Oral once PRN Moderate Pain (4 - 6)      FAMILY HISTORY:      Vital Signs Last 24 Hrs  T(C): 37.6 (13 Dec 2022 11:42), Max: 37.6 (13 Dec 2022 11:42)  T(F): 99.6 (13 Dec 2022 11:42), Max: 99.6 (13 Dec 2022 11:42)  HR: 64 (13 Dec 2022 14:00) (57 - 68)  BP: 112/60 (13 Dec 2022 14:00) (101/56 - 140/81)  BP(mean): 71 (13 Dec 2022 14:00) (63 - 74)  RR: 15 (13 Dec 2022 14:00) (14 - 22)  SpO2: 99% (13 Dec 2022 14:00) (95% - 100%)    Parameters below as of 13 Dec 2022 11:42  Patient On (Oxygen Delivery Method): nasal cannula  O2 Flow (L/min): 3      PHYSICAL EXAM:    GENERAL: NAD, well-developed  HEAD:  Atraumatic, Normocephalic  EYES: EOMI, PERRLA, conjunctiva and sclera clear  ENMT: No tonsillar erythema, exudates, or enlargement; Moist mucous membranes, Good dentition, No lesions  NECK: Supple, No JVD, Normal thyroid  NERVOUS SYSTEM:  Alert & Oriented X3, Good concentration;  CHEST/LUNG: Clear to auscultation bilaterally; No rales, rhonchi, wheezing, or rubs  HEART: Regular rate and rhythm; No murmurs, rubs, or gallops  ABDOMEN: Soft, Nontender, Nondistended; Bowel sounds present  EXTREMITIES:  2+ Peripheral Pulses, No clubbing, cyanosis, or edema

## 2022-12-13 NOTE — DISCHARGE NOTE PROVIDER - PROVIDER TOKENS
PROVIDER:[TOKEN:[[id number]],SCHEDULEDAPPT:[12/28/2022],SCHEDULEDAPPTTIME:[09:30 AM],ESTABLISHEDPATIENT:[T]]

## 2022-12-13 NOTE — DISCHARGE NOTE PROVIDER - NSDCMRMEDTOKEN_GEN_ALL_CORE_FT
alfuzosin 10 mg oral tablet, extended release: 1 tab(s) orally once a day  eszopiclone 3 mg oral tablet: 1 tab(s) orally once a day (at bedtime)  finasteride 5 mg oral tablet: 1 tab(s) orally once a day  gabapentin 600 mg oral tablet: 2 tab(s) orally once a day in the morning  gabapentin 600 mg oral tablet: 1 tab(s) orally once a day at bedtime  gemfibrozil 600 mg oral tablet: 1 tab(s) orally 2 times a day  naproxen 500 mg oral tablet:   Synthroid 125 mcg (0.125 mg) oral tablet: 1 tab(s) orally once a day (in the morning)  Synthroid 125 mcg (0.125 mg) oral tablet: 1 tab(s) orally once a day (in the afternoon)   acetaminophen: 1000 milligram(s) orally every 8 hours  alfuzosin 10 mg oral tablet, extended release: 1 tab(s) orally once a day  aspirin 81 mg oral delayed release tablet: 1 tab(s) orally every 12 hours  celecoxib 200 mg oral capsule: 1 cap(s) orally every 12 hours.  take 2 hrs after ecotrin  eszopiclone 3 mg oral tablet: 1 tab(s) orally once a day (at bedtime)  finasteride 5 mg oral tablet: 1 tab(s) orally once a day  gabapentin 100 mg oral capsule: 2 cap(s) orally once a day in a.m.  gabapentin 100 mg oral capsule: 1 cap(s) orally once a day (at bedtime)  gemfibrozil 600 mg oral tablet: 1 tab(s) orally 2 times a day  Narcan 4 mg/0.1 mL nasal spray: 4 milligram(s) intranasally once in case of overdose.  may repeat x 1 if unsucessful  omeprazole 20 mg oral delayed release capsule: 1 cap(s) orally once a day  before breakfast   oxyCODONE 5 mg oral tablet: 1-2  tab(s) orally every 4 hours, As Needed -Moderate to severe Pain MDD:6   [id number]  polyethylene glycol 3350 oral powder for reconstitution: 17 gram(s) orally once a day (at bedtime)  senna leaf extract oral tablet: 2 tab(s) orally once a day (at bedtime)  Synthroid 125 mcg (0.125 mg) oral tablet: 1 tab(s) orally once a day (in the morning)  Synthroid 125 mcg (0.125 mg) oral tablet: 1 tab(s) orally once a day (in the afternoon)

## 2022-12-13 NOTE — DISCHARGE NOTE PROVIDER - HOSPITAL COURSE
The patient is a 67 y.o. male with severe osteoarthritis of the left hip.  He was evaluated by the PST dept at [facility] and obtained the appropriate medical clearances.  After admission on 12/13/22 and receiving pre-operative parenteral prophylactic antibiotics (ancef and vanco (MRSA+)), the patient  underwent an  uncomplicated left anterior SCARLETT by orthopedic surgeon Dr. Juarez.    A medical consultation from the Hospitalist service was obtained for post-operative medical co-management. Typical Physical & occupational therapy modalities post anterior SCARLETT were performed including ambulation training, range of motion, ADL's, and transfers. Ecotrin 81 mg every 12 hrs, along w/ bilat venodynes, was given for DVT prophylaxis (caprini 9).  The patient had a clean appearing surgical incision with no sign of surgical site infections and had a stable neuro / vascular exam of the operated extremity.  After progression of mobility guided by the PT/ OT staff,  the patient was felt to benefit from further rehabilitative care for restoration to level of function. This was felt to best be accomplished at  home.  Discharge and Orthopedic Care instructions were delineated in the Discharge Plan and reviewed with the patient. All medications were delineated in the medication reconciliation tool and key points were reviewed with the patient. They were deemed stable from an Orthopedic & medical standpoint for discharge today.  Upon completion of Home care he will be  following up with Dr. Juarez for office  follow up Orthopedic care.   The patient is a 67 y.o. male with severe osteoarthritis of the left hip.  He was evaluated by the PST dept at [facility] and obtained the appropriate medical clearances.  On the day prior to surgery, he received preop radiation therapy for heterotopic ossification prophylaxis.  After admission on 12/13/22 and receiving pre-operative parenteral prophylactic antibiotics (ancef and vanco), the patient  underwent an  uncomplicated left anterior SCARLETT by orthopedic surgeon Dr. Juarez.    A medical consultation from the Hospitalist service was obtained for post-operative medical co-management. Typical Physical & occupational therapy modalities post anterior SCARLETT were performed including ambulation training, range of motion, ADL's, and transfers. Ecotrin 81 mg every 12 hrs, along w/ bilat venodynes, was given for DVT prophylaxis (caprini 9).  The patient had a clean appearing surgical incision with no sign of surgical site infections and had a stable neuro / vascular exam of the operated extremity.  After progression of mobility guided by the PT/ OT staff,  the patient was felt to benefit from further rehabilitative care for restoration to level of function. This was felt to best be accomplished at  home.  Discharge and Orthopedic Care instructions were delineated in the Discharge Plan and reviewed with the patient. All medications were delineated in the medication reconciliation tool and key points were reviewed with the patient. They were deemed stable from an Orthopedic & medical standpoint for discharge today.  Upon completion of Home care he will be  following up with Dr. Juarez for office  follow up Orthopedic care.

## 2022-12-13 NOTE — ASU PATIENT PROFILE, ADULT - NSICDXPASTMEDICALHX_GEN_ALL_CORE_FT
PAST MEDICAL HISTORY:  Atonic bladder self catheterize 3 x day    BPH (benign prostatic hyperplasia)     HLD (hyperlipidemia)     Hypothyroid     Insomnia     Morbid obesity with BMI of 40.0-44.9, adult BMI 43    Neuropathy bilateral feet    PRATIMA on CPAP     Osteoarthritis of left hip

## 2022-12-13 NOTE — BRIEF OPERATIVE NOTE - ESTIMATED BLOOD LOSS
Encounter Date: 12/10/2020       History     Chief Complaint   Patient presents with    Otalgia     pt c/o right ear pain since last night, denies any hearing difficulty, denies any drainage from ear     20-year-old female presents to ED with complaint of 2 day onset right ear pain.  Patient states she has attempted to take Tylenol with no improvement.  Pain described as sharp, nonradiating, severely 8/10.  She denies ringing in the ears, loss of hearing, headaches, sore throat, runny nose or congestion, cough, shortness of breath, chest or abdominal pain, fevers, chills, nausea, vomiting.  She does admit to having COVID 3 weeks ago, but states her symptoms have since resolved.  She is eating and drinking well at home. No other acute complaints at this time.           Review of patient's allergies indicates:  No Known Allergies  No past medical history on file.  No past surgical history on file.  Family History   Problem Relation Age of Onset    Hypertension Mother     Hypertension Maternal Grandmother     Aneurysm Maternal Grandmother     Diabetes Maternal Grandmother     Breast cancer Maternal Grandmother      Social History     Tobacco Use    Smoking status: Never Smoker    Smokeless tobacco: Never Used   Substance Use Topics    Alcohol use: No     Frequency: Never    Drug use: No     Review of Systems   Constitutional: Negative for activity change, chills and fever.   HENT: Positive for ear pain. Negative for congestion, drooling, ear discharge, facial swelling, sinus pressure, sinus pain, sore throat and tinnitus.    Respiratory: Negative for cough and shortness of breath.    Cardiovascular: Negative for chest pain.   Gastrointestinal: Negative for abdominal pain, nausea and vomiting.   Neurological: Negative for headaches.       Physical Exam     Initial Vitals [12/10/20 1037]   BP Pulse Resp Temp SpO2   127/72 78 18 98.8 °F (37.1 °C) 97 %      MAP       --         Physical Exam    Nursing note and  vitals reviewed.  Constitutional: Vital signs are normal. She appears well-developed and well-nourished. She is cooperative.  Non-toxic appearance. She does not have a sickly appearance. She does not appear ill. No distress.   HENT:   Head: Normocephalic and atraumatic.   Nose: Nose normal. No rhinorrhea.   Mouth/Throat: Uvula is midline and oropharynx is clear and moist.   Right ear external ear normal with no tragus tenderness.  Canal is clean with no erythema or swelling.  TM intact, noted to have bulge with fluid collection.  Left ear external ear normal.  Canal normal.  TM intact and normal.   Eyes: Conjunctivae and EOM are normal.   Neck: Normal range of motion. Neck supple.   Cardiovascular: Normal rate.   Pulmonary/Chest: Effort normal.   Abdominal: Soft. Normal appearance.   Musculoskeletal: No tenderness.   Neurological: She is alert and oriented to person, place, and time. GCS eye subscore is 4. GCS verbal subscore is 5. GCS motor subscore is 6.   Skin: Skin is warm and dry.   Psychiatric: She has a normal mood and affect. Her speech is normal and behavior is normal. Thought content normal.         ED Course   Procedures  Labs Reviewed   POCT URINE PREGNANCY          Imaging Results    None          Medical Decision Making:   Differential Diagnosis:   Otitis media, otitis externa, URI, allergy/irritant, viral  ED Management:  Patient presents with 2 day onset right ear pain.  No associated fevers, chills, sore throat, or other URI like symptoms.  She does admit to having COVID 3 weeks ago, but states her symptoms have since improved.  On exam, right TM bulging noted with fluid collection.  Findings concerning for acute otitis media.  She will be prescribed amoxicillin, encouraged to alternate Tylenol/ibuprofen as needed, close follow-up with PCP.  ED return precautions discussed.  Patient and mother understand instructions and agrees with plan.                             Clinical Impression:      ICD-10-CM ICD-9-CM   1. Acute otitis media, unspecified otitis media type  H66.90 382.9                          ED Disposition Condition    Discharge Stable        ED Prescriptions     Medication Sig Dispense Start Date End Date Auth. Provider    amoxicillin (AMOXIL) 500 MG capsule Take 1 capsule (500 mg total) by mouth every 8 (eight) hours. for 7 days 21 capsule 12/10/2020 12/17/2020 Francisco Floyd PA-C        Follow-up Information     Follow up With Specialties Details Why Contact Info    Dio Man, DO Family Medicine Call  As needed [address] [facility] [address]  [phone]                                         Francisco Floyd PA-C  12/10/20 1300     1000

## 2022-12-13 NOTE — DISCHARGE NOTE PROVIDER - NSDCCPCAREPLAN_GEN_ALL_CORE_FT
PRINCIPAL DISCHARGE DIAGNOSIS  Diagnosis: Primary osteoarthritis of left hip  Assessment and Plan of Treatment: left anterior SCARLETT  Physical Therapy/Occupational Therapy for: Ambulation, transfers, stairs, & ADLs, isometrics.   Full weight bearing on both legs; Walker/cane use as instructed by Physical therapy/Occupational therapy. Anterior Total Hip Replacement precautions for  6 weeks: No straight leg raise; No external rotation of hip when extended-standing or lying flat; No hyperextension of hip when standing (kickback).   Ice packs to hip for 30 min. every 3 hours and after physical therapy.   Keep incision clean and dry. You have a silverlon dressing. It is water resistant and may get slightly wet in the shower.  Remove dressing in 7 days and leave wound open to air.  Wound may get wet in the shower as long as there is no drainage from   Suture removal on/near after Post Op Day # 14 in Surgeon's office.  • Do not take a tub bath yet.   • Do not resume driving until you have your surgeon’s permission.         PRINCIPAL DISCHARGE DIAGNOSIS  Diagnosis: Primary osteoarthritis of left hip  Assessment and Plan of Treatment: left anterior SCARLETT  Physical Therapy/Occupational Therapy for: Ambulation, transfers, stairs, & ADLs, isometrics.   Full weight bearing on both legs; Walker/cane use as instructed by Physical therapy/Occupational therapy. Anterior Total Hip Replacement precautions for  6 weeks: No straight leg raise; No external rotation of hip when extended-standing or lying flat; No hyperextension of hip when standing (kickback).   Ice packs to hip for 30 min. every 3 hours and after physical therapy.   Keep incision clean and dry. You have a silverlon dressing. It is water resistant and may get slightly wet in the shower.  Remove dressing in 7 days and leave wound open to air.  Wound may get wet in the shower as long as there is no drainage from   Suture removal on/near after Post Op Day # 14 in Surgeon's office.  • Do not take a tub bath yet.   • Do not resume driving until you have your surgeon’s permission.   Do not keep opioid in the medicine cabinet in bathroom where others may have access to it.  Do not drive while taking opioid.  To dispose of it some pharmacies do have drop boxes as do police stations.  Do not flush or put in trash.

## 2022-12-13 NOTE — DISCHARGE NOTE PROVIDER - CARE PROVIDER_API CALL
TONY Juarez (MD)  Orthopaedic Surgery  [address], [address]  [place] [address]  Phone: [phone]  Fax: [phone]  Established Patient  Scheduled Appointment: 12/28/2022 09:30 AM

## 2022-12-13 NOTE — DISCHARGE NOTE PROVIDER - INSTRUCTIONS
For Constipation :   • Increase your water intake. Drink at least 8 glasses of water daily.  • Try adding fiber to your diet by eating fruits, vegetables and foods that are rich in grains.  • If you do experience constipation, you may take an over-the-counter stool softener/laxative such as Flora Colace, Senekot, miralax or  Milk of Magnesia.

## 2022-12-13 NOTE — ASU PATIENT PROFILE, ADULT - FALL HARM RISK - HARM RISK INTERVENTIONS

## 2022-12-13 NOTE — PROGRESS NOTE ADULT - SUBJECTIVE AND OBJECTIVE BOX
Ortho Post Op Check        Procedure: left ant SCARLETT  Surgeon: Larry    Pt comfortable without complaints, pain controlled  Denies CP, SOB, N/V, numbness/tingling .  c/o bilat dry eye sensation  Pain Rx:  HYDROmorphone  Injectable 0.5 milliGRAM(s) IV Push every 10 minutes PRN  ondansetron Injectable 4 milliGRAM(s) IV Push once PRN  oxyCODONE    IR 5 milliGRAM(s) Oral once PRN      General Exam:  Vital Signs Last 24 Hrs  T(C): 37.6 (12-13-22 @ 11:42), Max: 37.6 (12-13-22 @ 11:42)  T(F): 99.6 (12-13-22 @ 11:42), Max: 99.6 (12-13-22 @ 11:42)  HR: 63 (12-13-22 @ 12:42) (63 - 68)  BP: 102/54 (12-13-22 @ 12:42) (101/56 - 113/62)  BP(mean): 66 (12-13-22 @ 12:42) (63 - 74)  RR: 16 (12-13-22 @ 12:42) (15 - 22)  SpO2: 100% (12-13-22 @ 12:42) (95% - 100%)    General: Pt Alert and oriented, NAD, controlled pain.  Eyes: no discharge or corneal injection bilat  Heart: RRR no murmur  Lungs: clear  Abd: BS hypoactive  Left hip silverlon clean and dry.  hemovac in place.  chaney was placed as well  Exercises reviewed and performed by pt.    A/P: 67yMale POD#0 s/p left ant SCARLETT  - Stable  - Pain Control  - DVT ppx: ecotrin  - Post op abx: ancef/vanco  - PT eval pending  - Weight bearing status: wbat  - d/c plan--home tomorrow if cleared

## 2022-12-13 NOTE — BRIEF OPERATIVE NOTE - COMMENTS
implants: acet 56 mm pinnacle gription cluster cup w/ 0 degree altrx liner, femur #7  Emphasys standard offset collared stem w/ 35+5 biolox head

## 2022-12-13 NOTE — DISCHARGE NOTE PROVIDER - NSDCFUSCHEDAPPT_GEN_ALL_CORE_FT
[facility]  ORTHOSURG [address]  Scheduled Appointment: 12/28/2022

## 2022-12-13 NOTE — CONSULT NOTE ADULT - ASSESSMENT
67M s/p L hip replacement    #L hip replacement POD1  - pain control and DVT ppx per ortho  - bowel regimen  - incentive spirometer  - PT/OT  - check basic labs in am  - resume home gabapentin    #PRATIMA on CPAP  - wife to bring in home machine for tonight  - monitor O2 sats    #Atonic bladder  - self catheterize as needed  - continue home alfuzosin and finasteride    #Eye pain  - no blurry vision or vision loss  - EOMI  - start artificial tears prn    #Hypothyroid  - resume home synthroid 67M s/p L hip replacement    #L hip replacement POD0  - pain control and DVT ppx per ortho  - bowel regimen  - incentive spirometer  - PT/OT  - check basic labs in am  - resume home gabapentin    #PRATIMA on CPAP  - wife to bring in home machine for tonight  - monitor O2 sats    #Atonic bladder  - self catheterize as needed  - continue home alfuzosin and finasteride    #Eye pain  - no blurry vision or vision loss  - EOMI  - start artificial tears prn    #Hypothyroid  - resume home synthroid

## 2022-12-14 ENCOUNTER — TRANSCRIPTION ENCOUNTER (OUTPATIENT)
Age: 67
End: 2022-12-14

## 2022-12-14 VITALS
SYSTOLIC BLOOD PRESSURE: 151 MMHG | OXYGEN SATURATION: 94 % | TEMPERATURE: 98 F | HEART RATE: 68 BPM | DIASTOLIC BLOOD PRESSURE: 68 MMHG | RESPIRATION RATE: 18 BRPM

## 2022-12-14 LAB
ANION GAP SERPL CALC-SCNC: 9 MMOL/L — SIGNIFICANT CHANGE UP (ref 5–17)
BUN SERPL-MCNC: 15 MG/DL — SIGNIFICANT CHANGE UP (ref 7–23)
CALCIUM SERPL-MCNC: 7.1 MG/DL — LOW (ref 8.4–10.5)
CHLORIDE SERPL-SCNC: 103 MMOL/L — SIGNIFICANT CHANGE UP (ref 96–108)
CO2 SERPL-SCNC: 25 MMOL/L — SIGNIFICANT CHANGE UP (ref 22–31)
CREAT SERPL-MCNC: 0.86 MG/DL — SIGNIFICANT CHANGE UP (ref 0.5–1.3)
EGFR: 96 ML/MIN/1.73M2 — SIGNIFICANT CHANGE UP
GLUCOSE SERPL-MCNC: 116 MG/DL — HIGH (ref 70–99)
HCT VFR BLD CALC: 34.8 % — LOW (ref 39–50)
HGB BLD-MCNC: 11.5 G/DL — LOW (ref 13–17)
MCHC RBC-ENTMCNC: 31.3 PG — SIGNIFICANT CHANGE UP (ref 27–34)
MCHC RBC-ENTMCNC: 33 GM/DL — SIGNIFICANT CHANGE UP (ref 32–36)
MCV RBC AUTO: 94.6 FL — SIGNIFICANT CHANGE UP (ref 80–100)
NRBC # BLD: 0 /100 WBCS — SIGNIFICANT CHANGE UP (ref 0–0)
PLATELET # BLD AUTO: 184 K/UL — SIGNIFICANT CHANGE UP (ref 150–400)
POTASSIUM SERPL-MCNC: 4.1 MMOL/L — SIGNIFICANT CHANGE UP (ref 3.5–5.3)
POTASSIUM SERPL-SCNC: 4.1 MMOL/L — SIGNIFICANT CHANGE UP (ref 3.5–5.3)
RBC # BLD: 3.68 M/UL — LOW (ref 4.2–5.8)
RBC # FLD: 13.3 % — SIGNIFICANT CHANGE UP (ref 10.3–14.5)
SODIUM SERPL-SCNC: 137 MMOL/L — SIGNIFICANT CHANGE UP (ref 135–145)
WBC # BLD: 14.25 K/UL — HIGH (ref 3.8–10.5)
WBC # FLD AUTO: 14.25 K/UL — HIGH (ref 3.8–10.5)

## 2022-12-14 PROCEDURE — 97165 OT EVAL LOW COMPLEX 30 MIN: CPT

## 2022-12-14 PROCEDURE — 85027 COMPLETE CBC AUTOMATED: CPT

## 2022-12-14 PROCEDURE — C9399: CPT

## 2022-12-14 PROCEDURE — 27130 TOTAL HIP ARTHROPLASTY: CPT | Mod: LT

## 2022-12-14 PROCEDURE — 94664 DEMO&/EVAL PT USE INHALER: CPT

## 2022-12-14 PROCEDURE — C1889: CPT

## 2022-12-14 PROCEDURE — 99232 SBSQ HOSP IP/OBS MODERATE 35: CPT

## 2022-12-14 PROCEDURE — C1776: CPT

## 2022-12-14 PROCEDURE — 86901 BLOOD TYPING SEROLOGIC RH(D): CPT

## 2022-12-14 PROCEDURE — 86900 BLOOD TYPING SEROLOGIC ABO: CPT

## 2022-12-14 PROCEDURE — 80048 BASIC METABOLIC PNL TOTAL CA: CPT

## 2022-12-14 PROCEDURE — 97530 THERAPEUTIC ACTIVITIES: CPT

## 2022-12-14 PROCEDURE — 36415 COLL VENOUS BLD VENIPUNCTURE: CPT

## 2022-12-14 PROCEDURE — 76000 FLUOROSCOPY <1 HR PHYS/QHP: CPT

## 2022-12-14 PROCEDURE — 86850 RBC ANTIBODY SCREEN: CPT

## 2022-12-14 PROCEDURE — 97161 PT EVAL LOW COMPLEX 20 MIN: CPT

## 2022-12-14 PROCEDURE — 97116 GAIT TRAINING THERAPY: CPT

## 2022-12-14 PROCEDURE — 97535 SELF CARE MNGMENT TRAINING: CPT

## 2022-12-14 RX ORDER — OXYCODONE HYDROCHLORIDE 5 MG/1
1 TABLET ORAL
Qty: 30 | Refills: 0
Start: 2022-12-14

## 2022-12-14 RX ADMIN — CELECOXIB 200 MILLIGRAM(S): 200 CAPSULE ORAL at 09:22

## 2022-12-14 RX ADMIN — Medication 81 MILLIGRAM(S): at 05:07

## 2022-12-14 RX ADMIN — Medication 125 MICROGRAM(S): at 05:06

## 2022-12-14 RX ADMIN — OXYCODONE HYDROCHLORIDE 5 MILLIGRAM(S): 5 TABLET ORAL at 09:22

## 2022-12-14 RX ADMIN — Medication 101.6 MILLIGRAM(S): at 05:06

## 2022-12-14 RX ADMIN — FINASTERIDE 5 MILLIGRAM(S): 5 TABLET, FILM COATED ORAL at 12:01

## 2022-12-14 RX ADMIN — GABAPENTIN 200 MILLIGRAM(S): 400 CAPSULE ORAL at 12:01

## 2022-12-14 RX ADMIN — Medication 600 MILLIGRAM(S): at 05:06

## 2022-12-14 RX ADMIN — Medication 1 DROP(S): at 15:04

## 2022-12-14 RX ADMIN — Medication 100 MILLIGRAM(S): at 00:24

## 2022-12-14 RX ADMIN — Medication 1000 MILLIGRAM(S): at 15:04

## 2022-12-14 RX ADMIN — TAMSULOSIN HYDROCHLORIDE 0.8 MILLIGRAM(S): 0.4 CAPSULE ORAL at 12:01

## 2022-12-14 RX ADMIN — OXYCODONE HYDROCHLORIDE 5 MILLIGRAM(S): 5 TABLET ORAL at 00:07

## 2022-12-14 RX ADMIN — Medication 1000 MILLIGRAM(S): at 05:08

## 2022-12-14 RX ADMIN — Medication 1000 MILLIGRAM(S): at 05:07

## 2022-12-14 RX ADMIN — Medication 1 DROP(S): at 05:06

## 2022-12-14 NOTE — DISCHARGE NOTE NURSING/CASE MANAGEMENT/SOCIAL WORK - NSSCNAMETXT_GEN_ALL_CORE
[facility] care agency [phone] will reach out to you within 24-72 hours of your discharge to schedule home care visit/eval appointment with you. Please call agency for any queries regarding home care services

## 2022-12-14 NOTE — PROGRESS NOTE ADULT - ASSESSMENT
67M s/p L hip replacement    #L hip replacement POD1  - pain control and DVT ppx per ortho  - bowel regimen  - incentive spirometer  - PT/OT  - resume home gabapentin    #Leukocytosis  - WBC of 14, likely reactive  - no signs of infection  - continue to monitor    #PRATIMA on CPAP  - CPAP at bedtime  - monitor O2 sats    #Atonic bladder  - s/p chaney, TOJENNIFER today  - self catheterize as needed  - continue home alfuzosin and finasteride    #Eye pain  - no blurry vision or vision loss  - EOMI  - artificial tears prn    #Hypothyroid  - resume home synthroid

## 2022-12-14 NOTE — OCCUPATIONAL THERAPY INITIAL EVALUATION ADULT - ADDITIONAL COMMENTS
Pt lives with spouse 3 JADA will  stay on 1 st floor Pt lives with spouse 3 JADA will  stay on 1 st floor  +tub

## 2022-12-14 NOTE — DISCHARGE NOTE NURSING/CASE MANAGEMENT/SOCIAL WORK - PATIENT PORTAL LINK FT
You can access the FollowMyHealth Patient Portal offered by [facility] by registering at the following website: [email or web address]. By joining [facility]’s FollowMyHealth portal, you will also be able to view your health information using other applications (apps) compatible with our system.

## 2022-12-14 NOTE — PROGRESS NOTE ADULT - SUBJECTIVE AND OBJECTIVE BOX
Discharge medication calendar:  ASA EC 81mg q12h x 4 weeks  Omeprazole 20mg QAM x 4 weeks  Celecoxib 200mg q12h x 2-3 weeks  APAP 1000mg q8h x 2-3 weeks  Narcotic PRN  Docusate 100mg TID while taking narcotic  Miralax, Senna, or Bisacodyl PRN for treatment of constipation

## 2022-12-14 NOTE — PROGRESS NOTE ADULT - SUBJECTIVE AND OBJECTIVE BOX
PROGRESS NOTE:   Authoted by Dr. Humza Flannery MD, Available on MS Teams    Patient is a 67y old  Male who presents with a chief complaint of left hip pain--for left ant SCARLETT (14 Dec 2022 07:50)    SUBJECTIVE / OVERNIGHT EVENTS:  Patient feels better this morning. No chest pain or shortness of breath. L leg feels heavy. Has not walked yet.     ADDITIONAL REVIEW OF SYSTEMS:    MEDICATIONS  (STANDING):  acetaminophen     Tablet .. 1000 milliGRAM(s) Oral every 8 hours  aspirin enteric coated 81 milliGRAM(s) Oral every 12 hours  celecoxib 200 milliGRAM(s) Oral every 12 hours  finasteride 5 milliGRAM(s) Oral daily  gabapentin 200 milliGRAM(s) Oral daily  gabapentin 100 milliGRAM(s) Oral at bedtime  gemfibrozil 600 milliGRAM(s) Oral two times a day  lactated ringers. 1000 milliLiter(s) (125 mL/Hr) IV Continuous <Continuous>  levothyroxine 125 MICROGram(s) Oral daily  pantoprazole    Tablet 40 milliGRAM(s) Oral before breakfast  polyethylene glycol 3350 17 Gram(s) Oral at bedtime  senna 2 Tablet(s) Oral at bedtime  tamsulosin 0.8 milliGRAM(s) Oral daily  tobramycin 0.3% Ophthalmic Solution 1 Drop(s) Both EYES every 8 hours    MEDICATIONS  (PRN):  artificial tears (preservative free) Ophthalmic Solution 1 Drop(s) Both EYES every 4 hours PRN Dry Eyes  HYDROmorphone  Injectable 0.5 milliGRAM(s) IV Push every 3 hours PRN breakthrough pain  magnesium hydroxide Suspension 30 milliLiter(s) Oral daily PRN Constipation  ondansetron Injectable 4 milliGRAM(s) IV Push every 6 hours PRN Nausea and/or Vomiting  oxyCODONE    IR 5 milliGRAM(s) Oral every 3 hours PRN Moderate Pain (4 - 6)  oxyCODONE    IR 10 milliGRAM(s) Oral every 3 hours PRN Severe Pain (7 - 10)  zolpidem 5 milliGRAM(s) Oral at bedtime PRN Insomnia      CAPILLARY BLOOD GLUCOSE        I&O's Summary    13 Dec 2022 07:01  -  14 Dec 2022 07:00  --------------------------------------------------------  IN: 3200 mL / OUT: 2770 mL / NET: 430 mL        PHYSICAL EXAM:  Vital Signs Last 24 Hrs  T(C): 37.1 (14 Dec 2022 07:59), Max: 37.6 (13 Dec 2022 11:42)  T(F): 98.7 (14 Dec 2022 07:59), Max: 99.6 (13 Dec 2022 11:42)  HR: 60 (14 Dec 2022 07:59) (60 - 68)  BP: 149/67 (14 Dec 2022 07:59) (101/56 - 149/67)  BP(mean): 69 (13 Dec 2022 15:15) (63 - 74)  RR: 18 (14 Dec 2022 07:59) (12 - 22)  SpO2: 94% (14 Dec 2022 07:59) (93% - 100%)    Parameters below as of 14 Dec 2022 07:59  Patient On (Oxygen Delivery Method): room air        CONSTITUTIONAL: NAD, well-developed  RESPIRATORY: Normal respiratory effort; lungs are clear to auscultation bilaterally  CARDIOVASCULAR: Regular rate and rhythm, normal S1 and S2, no murmur/rub/gallop; No lower extremity edema  ABDOMEN: Nontender to palpation, normoactive bowel sounds, no rebound/guarding  MUSCLOSKELETAL: no clubbing or cyanosis of digits; dec ROM of LLE due to pain  PSYCH: A+O to person, place, and time; affect appropriate    LABS:                        11.5   14.25 )-----------( 184      ( 14 Dec 2022 06:00 )             34.8     12-14    137  |  103  |  15  ----------------------------<  116<H>  4.1   |  25  |  0.86    Ca    7.1<L>      14 Dec 2022 06:00

## 2022-12-14 NOTE — PROGRESS NOTE ADULT - SUBJECTIVE AND OBJECTIVE BOX
Procedure:  Left Anterior THR  POD#: 1    S: Pt without complaints. No SOB,CP, N/V. Tolerated Fluids / Diet well.   Pain comfortable on  Tylenol + Celebrex. No BM yet  + flatus, No abdominal pain.  Took oxy 5 x 1.  chaney in place--to be removed after breakfast (pt self catheterizes.  Pain Rx:   acetaminophen     Tablet .. 1000 milliGRAM(s) Oral every 8 hours  celecoxib 200 milliGRAM(s) Oral every 12 hours  gabapentin 200 milliGRAM(s) Oral daily  gabapentin 100 milliGRAM(s) Oral at bedtime  HYDROmorphone  Injectable 0.5 milliGRAM(s) IV Push every 3 hours PRN  ondansetron Injectable 4 milliGRAM(s) IV Push every 6 hours PRN  oxyCODONE    IR 5 milliGRAM(s) Oral every 3 hours PRN  oxyCODONE    IR 10 milliGRAM(s) Oral every 3 hours PRN  zolpidem 5 milliGRAM(s) Oral at bedtime PRN    O: General: Pt Alert and oriented, On exam NAD,   VS: Vital Signs Last 24 Hrs  T(C): 36.8 (14 Dec 2022 03:30), Max: 37.6 (13 Dec 2022 11:42)  T(F): 98.2 (14 Dec 2022 03:30), Max: 99.6 (13 Dec 2022 11:42)  HR: 68 (14 Dec 2022 03:30) (62 - 68)  BP: 104/54 (14 Dec 2022 03:30) (101/56 - 122/64)  BP(mean): 69 (13 Dec 2022 15:15) (63 - 74)  RR: 17 (14 Dec 2022 03:30) (12 - 22)  SpO2: 96% (14 Dec 2022 03:30) (93% - 100%)    Parameters below as of 14 Dec 2022 03:30  Patient On (Oxygen Delivery Method): room air      Heart: RRR  Lungs: BS clear bilat.  Abdomen: Soft; no distention, benign exam    Ext: Left Ant. Hip: silverlon clean and dry.  hemovac removed--pt tolerated well.  Neurologic: Has sensation bilat. feet & toes ;  Full AROM bilat feet & toes. EHL / AT  = Bilat: 5/5 ; Sensation Present mid lateral thigh  Vascular: Feet toes warm, pink. DP = 2+. Calves soft ; w/o tenderness bilat..  VTEP: On Bilat. Venodynes +   aspirin enteric coated 81 milliGRAM(s) Oral every 12 hours    H.O Prophylaxis: Celebrex 200mg BID - Goal 21 Days   Activity in PT:Walked  100'  Exercises reviewed and performed by pt.                          12.0   14.91 )-----------( 184      ( 13 Dec 2022 19:44 )             35.8     12-13    140  |  106  |  16  ----------------------------<  170<H>  4.7   |  24  |  1.24    Ca    7.2<L>      13 Dec 2022 19:44        Hospitalist input noted    Primary Orthopedic Assessment:  • Stable from Orthopedic perspective  • Neuro motor exam stable:   • Labs: stable--today's labs pending      Plan:   • Continue:  PT/OT/Weightbearing as tolerated with assistance of a walker/Anterior THR precautions/Ice to hip/          Incentive spirometry encouraged / Celebrex for HO PPX  • Continue DVT prophylaxis as prescribed, including use of compression devices and ankle pumps  • Continue Pain Rx  • Anterior hip precautions reviewed with patient  • Plans per Medicine /  • Discharge planning – anticipated discharge is Home D/C with home care & home PT when medically stable & cleared by PT/OT--today  -- Opioid safety discussed w/ pt.  Do not keep it in the medicine cabinet in bathroom where others may have access to it.  Do not drive while taking opioid.  To dispose of it some pharmacies do have drop boxes as do police stations.  Do not flush or put in trash.         Procedure:  Left Anterior THR  POD#: 1    S: Pt without complaints. No SOB,CP, N/V. Tolerated Fluids / Diet well.   Pain comfortable on  Tylenol + Celebrex. No BM yet  + flatus, No abdominal pain.  Took oxy 5 x 1.  chaney in place--to be removed after breakfast (pt self catheterizes.  Pain Rx:   acetaminophen     Tablet .. 1000 milliGRAM(s) Oral every 8 hours  celecoxib 200 milliGRAM(s) Oral every 12 hours  gabapentin 200 milliGRAM(s) Oral daily  gabapentin 100 milliGRAM(s) Oral at bedtime  HYDROmorphone  Injectable 0.5 milliGRAM(s) IV Push every 3 hours PRN  ondansetron Injectable 4 milliGRAM(s) IV Push every 6 hours PRN  oxyCODONE    IR 5 milliGRAM(s) Oral every 3 hours PRN  oxyCODONE    IR 10 milliGRAM(s) Oral every 3 hours PRN  zolpidem 5 milliGRAM(s) Oral at bedtime PRN    O: General: Pt Alert and oriented, On exam NAD,   VS: Vital Signs Last 24 Hrs  T(C): 36.8 (14 Dec 2022 03:30), Max: 37.6 (13 Dec 2022 11:42)  T(F): 98.2 (14 Dec 2022 03:30), Max: 99.6 (13 Dec 2022 11:42)  HR: 68 (14 Dec 2022 03:30) (62 - 68)  BP: 104/54 (14 Dec 2022 03:30) (101/56 - 122/64)  BP(mean): 69 (13 Dec 2022 15:15) (63 - 74)  RR: 17 (14 Dec 2022 03:30) (12 - 22)  SpO2: 96% (14 Dec 2022 03:30) (93% - 100%)    Parameters below as of 14 Dec 2022 03:30  Patient On (Oxygen Delivery Method): room air      Heart: RRR  Lungs: BS clear bilat.  Abdomen: Soft; no distention, benign exam    Ext: Left Ant. Hip: silverlon clean and dry.  hemovac removed--pt tolerated well.  Neurologic: Has sensation bilat. feet & toes ;  Full AROM bilat feet & toes. EHL / AT  = Bilat: 5/5 ; Sensation Present mid lateral thigh  Vascular: Feet toes warm, pink. DP = 2+. Calves soft ; w/o tenderness bilat..  VTEP: On Bilat. Venodynes +   aspirin enteric coated 81 milliGRAM(s) Oral every 12 hours    H.O Prophylaxis: preop RT  Activity in PT:Walked  100'  Exercises reviewed and performed by pt.                          12.0   14.91 )-----------( 184      ( 13 Dec 2022 19:44 )             35.8     12-13    140  |  106  |  16  ----------------------------<  170<H>  4.7   |  24  |  1.24    Ca    7.2<L>      13 Dec 2022 19:44        Hospitalist input noted    Primary Orthopedic Assessment:  • Stable from Orthopedic perspective  • Neuro motor exam stable:   • Labs: stable--today's labs pending      Plan:   • Continue:  PT/OT/Weightbearing as tolerated with assistance of a walker/Anterior THR precautions/Ice to hip/          Incentive spirometry encouraged   • Continue DVT prophylaxis as prescribed, including use of compression devices and ankle pumps  • Continue Pain Rx  • Anterior hip precautions reviewed with patient  • Plans per Medicine /  • Discharge planning – anticipated discharge is Home D/C with home care & home PT when medically stable & cleared by PT/OT--today  -- Opioid safety discussed w/ pt.  Do not keep it in the medicine cabinet in bathroom where others may have access to it.  Do not drive while taking opioid.  To dispose of it some pharmacies do have drop boxes as do police stations.  Do not flush or put in trash.         FAMILY HISTORY:  Family history of atherosclerosis, mother  FH: colon cancer, father  FH: heart attack, father

## 2022-12-14 NOTE — OCCUPATIONAL THERAPY INITIAL EVALUATION ADULT - GENERAL OBSERVATIONS, REHAB EVAL
Pt found supine in bed +SCDs, IV, MOHSEN wound dressing, LE wedges for positioning, chaney hemovac Pt found supine in bed +SCDs, IV,chaney hemovac

## 2022-12-14 NOTE — OCCUPATIONAL THERAPY INITIAL EVALUATION ADULT - TOILETING, PREVIOUS LEVEL OF FUNCTION, OT EVAL
12/15/18 1000   [place] Suicide Severity Rating Scale (C-SSRS)   1. Have you wished you were dead or wished you could go to sleep and not wake up? (past month) Yes   2. Have you actually had any thoughts of killing yourself? (past month) No   6. Have you ever done anything, started to do anything, or prepared to do anything to end your life? (lifetime) No   Suicide Evaluation Low Risk     Maintain current plan of care.   independent

## 2022-12-16 NOTE — PHARMACOTHERAPY INTERVENTION NOTE - COMMENTS
+MRSA/+MSSA  only received pre op vanco but also need post op vanco  Post op vanco not ordered Ordered Vanco because allergy to Cefdinir however no rxn to Ancef. Ancef was given preop + vanco.

## 2022-12-28 ENCOUNTER — APPOINTMENT (OUTPATIENT)
Dept: ORTHOPEDIC SURGERY | Facility: CLINIC | Age: 67
End: 2022-12-28
Payer: COMMERCIAL

## 2022-12-28 PROCEDURE — 99024 POSTOP FOLLOW-UP VISIT: CPT

## 2022-12-28 PROCEDURE — 73502 X-RAY EXAM HIP UNI 2-3 VIEWS: CPT

## 2022-12-28 PROCEDURE — 72170 X-RAY EXAM OF PELVIS: CPT | Mod: LT

## 2022-12-28 NOTE — HISTORY OF PRESENT ILLNESS
[de-identified] : Left total hip replacement December 13, 2022 [de-identified] : Denies groin or thigh pain ambulating with a cane reports marked decrease preop left hip painDenies neurovascular symptoms [de-identified] : Well-nourished no distress\par Left hip mild Trendelenburg incision healed leg lengths equal passive motion satisfactory pain-free neurovascular intact [de-identified] : X-ray AP pelvis left hip AP lateral reveal satisfactory postop appearance left total hip components [de-identified] : Left total hip replacement [de-identified] : Suture removal physical therapy follow-up 1 month

## 2023-01-30 ENCOUNTER — TRANSCRIPTION ENCOUNTER (OUTPATIENT)
Age: 68
End: 2023-01-30

## 2023-01-31 ENCOUNTER — TRANSCRIPTION ENCOUNTER (OUTPATIENT)
Age: 68
End: 2023-01-31

## 2023-02-01 ENCOUNTER — APPOINTMENT (OUTPATIENT)
Dept: ORTHOPEDIC SURGERY | Facility: CLINIC | Age: 68
End: 2023-02-01
Payer: MEDICARE

## 2023-02-01 VITALS — HEIGHT: 70 IN | BODY MASS INDEX: 42.95 KG/M2 | WEIGHT: 300 LBS

## 2023-02-01 PROCEDURE — 99024 POSTOP FOLLOW-UP VISIT: CPT

## 2023-02-01 NOTE — DISCHARGE NOTE PROVIDER - NSDCHHASSISTILLNESS_GEN_ALL_CORE
Procedure Note:    I discussed with the patient the potential benefits of performing a therapeutic injections as well as potential risks including but not limited to infection, swelling, pain, bleeding, bruising, nerve/vessel damage, skin color changes, transient elevation in blood glucose levels, and fat atrophy. After informed consent and after the areas were prepped with chlorhexadine soap, ethyl chloride was used to numb the skin. Via the superiorlateral approach, 3mL of 1% lidocaine followed by Orthovisc No. 2 were each injected into the right knee joint. The patient tolerated the procedure well. There were no complications. A sterile dressing was placed over the injection sites.     hip replacement

## 2023-02-01 NOTE — HISTORY OF PRESENT ILLNESS
[de-identified] : Left total hip replacement December 13, 2022.  [de-identified] : Patient presents for 2nd postop visit today. He denies groin or thigh pain. He reports marked decrease preop left hip pain. He is doing PT and it is going well. Denies neurovascular symptoms. He is ambulating independently. .  Denies fever chills. [de-identified] : Well-nourished no distress\par Left hip mild Trendelenburg incision healed leg lengths equal passive motion satisfactory pain-free neurovascular intact [de-identified] : Left total hip replacement [de-identified] : Physical therapy follow-up 2 months

## 2023-04-12 ENCOUNTER — APPOINTMENT (OUTPATIENT)
Dept: ORTHOPEDIC SURGERY | Facility: CLINIC | Age: 68
End: 2023-04-12
Payer: MEDICARE

## 2023-04-12 PROCEDURE — 99213 OFFICE O/P EST LOW 20 MIN: CPT

## 2023-04-12 PROCEDURE — 73502 X-RAY EXAM HIP UNI 2-3 VIEWS: CPT

## 2023-04-12 NOTE — PHYSICAL EXAM
[de-identified] : Constitutional: Well nourished , well developed and in no acute distress\par Psychiatric: Alert and oriented to time place and person.Appropriate affect .\par Skin: Head, neck, arms and lower extremities:no lesions or discoloration\par HEENT: Normocephalic, EOM intact, Nasal septum midline,\par Respiratory: Unlabored respirations,no audible wheezing ,no tachypnea, no cyanosis\par Cardiovascular: No leg swelling no ankle edema no JVD, pulse regular\par Vascular: No calf or thigh tenderness, \par Peripheral pulses: intact\par Lymphatics: No groin adenopathy,no lymphedema lower or upper extremities\par \par Gait: Satisfactory\par \par Left Hip\par Inspection/Palpation : no tenderness, no swelling, no deformity, healed surgical incision. \par Leg Lengths: equal\par Passive range of motion: satisfactory pain free\par Strength: resisted hip flexion 5/5, resisted hip abduction 5/5\par Skin : no erythema, no ecchymosis\par Sensation : sensation to light touch intact\par Vascular Exam : no edema, no cyanosis, dorsalis pedis artery pulse 2+, posterior tibial artery pulse 2+  [de-identified] : no new imaging

## 2023-04-12 NOTE — DISCUSSION/SUMMARY
[de-identified] : 67 year old male progressing well s/p left total hip replacement on 12/13/22. Continue with physical therapy and exercises. Follow up in 6 months.

## 2023-04-12 NOTE — HISTORY OF PRESENT ILLNESS
[de-identified] : LINO HALE is a 67 year old male who presents for follow-up evaluation s/p left total hip replacement 12/13/22. Overall patient has been progressing well. He has been attending physical therapy with good relief. He has been attempting to lose weight. Denies groin or thigh pain, fever, chills, or wound complications.

## 2023-04-12 NOTE — HISTORY OF PRESENT ILLNESS
[de-identified] : LINO HALE is a 67 year old male who presents for follow-up evaluation s/p left total hip replacement 12/13/22. Overall patient has been progressing well. He has been attending physical therapy with good relief. He has been attempting to lose weight. Denies groin or thigh pain, fever, chills, or wound complications.

## 2023-04-12 NOTE — DISCUSSION/SUMMARY
[de-identified] : 67 year old male progressing well s/p left total hip replacement on 12/13/22. Continue with physical therapy and exercises. Follow up in 6 months.

## 2023-04-12 NOTE — PHYSICAL EXAM
[de-identified] : Constitutional: Well nourished , well developed and in no acute distress\par Psychiatric: Alert and oriented to time place and person.Appropriate affect .\par Skin: Head, neck, arms and lower extremities:no lesions or discoloration\par HEENT: Normocephalic, EOM intact, Nasal septum midline,\par Respiratory: Unlabored respirations,no audible wheezing ,no tachypnea, no cyanosis\par Cardiovascular: No leg swelling no ankle edema no JVD, pulse regular\par Vascular: No calf or thigh tenderness, \par Peripheral pulses: intact\par Lymphatics: No groin adenopathy,no lymphedema lower or upper extremities\par \par Gait: Satisfactory\par \par Left Hip\par Inspection/Palpation : no tenderness, no swelling, no deformity, healed surgical incision. \par Leg Lengths: equal\par Passive range of motion: satisfactory pain free\par Strength: resisted hip flexion 5/5, resisted hip abduction 5/5\par Skin : no erythema, no ecchymosis\par Sensation : sensation to light touch intact\par Vascular Exam : no edema, no cyanosis, dorsalis pedis artery pulse 2+, posterior tibial artery pulse 2+  [de-identified] : no new imaging

## 2023-04-12 NOTE — ADDENDUM
[FreeTextEntry1] : I, Driss Rodriguez, acted solely as a scribe for Dr. Rishi Juarez MD on this date 04/12/2023  .\par  \par All medical record entries made by the Scribe were at my,  Dr. Rishi Juarez MD , direction and personally dictated by me on 04/12/2023 . I have reviewed the chart and agree that the record accurately reflects my personal performance of the history, physical exam, assessment and plan. I have also personally directed, reviewed, and agreed with the chart.

## 2023-09-12 ENCOUNTER — TRANSCRIPTION ENCOUNTER (OUTPATIENT)
Age: 68
End: 2023-09-12

## 2023-09-12 RX ORDER — AMOXICILLIN 500 MG/1
500 TABLET, FILM COATED ORAL
Qty: 4 | Refills: 2 | Status: ACTIVE | COMMUNITY
Start: 2023-09-12 | End: 1900-01-01

## 2023-09-21 ASSESSMENT — HOOS JR
IMPORTED LATERALITY: LEFT
WALKING ON UNEVEN SURFACE: SEVERE
SITTING: MODERATE
BENDING TO THE FLOOR TO PICK UP OBJECT: SEVERE
IMPORTED FORM: YES
LYING IN BED (TURNING OVER, MAINTAINING HIP POSITION): MODERATE
HOOS JR RAW SCORE: 16
IMPORTED HOOS JR SCORE: 16.0
RISING FROM SITTING: SEVERE
GOING UP OR DOWN STAIRS: SEVERE

## 2023-10-18 ENCOUNTER — APPOINTMENT (OUTPATIENT)
Dept: ORTHOPEDIC SURGERY | Facility: CLINIC | Age: 68
End: 2023-10-18
Payer: MEDICARE

## 2023-10-18 VITALS — BODY MASS INDEX: 42.95 KG/M2 | HEIGHT: 70 IN | WEIGHT: 300 LBS

## 2023-10-18 DIAGNOSIS — Z96.642 PRESENCE OF LEFT ARTIFICIAL HIP JOINT: ICD-10-CM

## 2023-10-18 PROCEDURE — 99213 OFFICE O/P EST LOW 20 MIN: CPT

## 2023-10-18 PROCEDURE — 73502 X-RAY EXAM HIP UNI 2-3 VIEWS: CPT

## 2023-10-20 ENCOUNTER — TRANSCRIPTION ENCOUNTER (OUTPATIENT)
Age: 68
End: 2023-10-20

## 2024-05-09 NOTE — DISCUSSION/SUMMARY
[de-identified] : Impression given temporary relief of hip thigh and knee pain following diagnostic Marcaine injection patient's pain generator is osteoarthritis of the hip more than tendinitis.  As such he is a candidate for total hip replacement.  He will follow-up in 1 month
NONE

## 2024-07-10 NOTE — H&P PST ADULT - GENITOURINARY COMMENTS
Take flomax once each am    Do labs by July 31    I will call w results    Great job w [facility]    Colonoscopoy at 46    Have a great summer    See me next summer    Thanks for all your good work   Bladder atony ; self catheterize 3 x day

## 2024-10-23 ENCOUNTER — APPOINTMENT (OUTPATIENT)
Dept: ORTHOPEDIC SURGERY | Facility: CLINIC | Age: 69
End: 2024-10-23
Payer: MEDICARE

## 2024-10-23 VITALS — HEIGHT: 70 IN | BODY MASS INDEX: 41.52 KG/M2 | WEIGHT: 290 LBS

## 2024-10-23 DIAGNOSIS — Z96.642 PRESENCE OF LEFT ARTIFICIAL HIP JOINT: ICD-10-CM

## 2024-10-23 PROCEDURE — 99213 OFFICE O/P EST LOW 20 MIN: CPT

## 2024-10-23 PROCEDURE — 73502 X-RAY EXAM HIP UNI 2-3 VIEWS: CPT

## (undated) DEVICE — SOL IRR POUR NS 0.9% 500ML

## (undated) DEVICE — SUT TEVDEK 1 30" KC-6

## (undated) DEVICE — DRAIN JACKSON PRATT 3 SPRING RESERVOIR W 10FR PVC DRAIN

## (undated) DEVICE — DRAPE 3/4 SHEET 52X76"

## (undated) DEVICE — SUT POLYSORB 1 27" GS-12 UNDYED

## (undated) DEVICE — WOUND IRR IRRISEPT W 0.5 CHG

## (undated) DEVICE — SUT POLYSORB 2-0 30" GS-11 UNDYED

## (undated) DEVICE — DRAPE PATIENT ISOLATION

## (undated) DEVICE — SAW BLADE STRYKER SAGITTAL AGGRESSIVE 25X86.5X1.32MM

## (undated) DEVICE — ELCTR STRYKER NEPTUNE SMOKE EVACUATION PENCIL (GREEN)

## (undated) DEVICE — SUT POLYSORB 0 60" REEL

## (undated) DEVICE — Device

## (undated) DEVICE — VISITEC 4X4

## (undated) DEVICE — POSITIONER STRAP ARMBOARD VELCRO TS-30

## (undated) DEVICE — PACK TOTAL HIP

## (undated) DEVICE — GLV 8 PROTEXIS (WHITE)

## (undated) DEVICE — BAG SPONGE COUNTER EZ

## (undated) DEVICE — POSITIONER POSITIONING ROLL  5X17"

## (undated) DEVICE — ELCTR BOVIE TIP BLADE INSULATED 6.5" EDGE

## (undated) DEVICE — DRSG SILVERLON ISLAND 4X6" 2X4" PAD

## (undated) DEVICE — GOWN TRIMAX XXL

## (undated) DEVICE — SUT MONOSOF 3-0 30" C-16

## (undated) DEVICE — DRSG SILVERLON ISLAND 4X10" 2X8" PAD

## (undated) DEVICE — NDL HYPO SAFE 22G X 1.5" (BLACK)

## (undated) DEVICE — SPECIMEN CONTAINER PET

## (undated) DEVICE — WARMING BLANKET UPPER ADULT

## (undated) DEVICE — SOL IRR POUR H2O 1500ML

## (undated) DEVICE — DRAPE C ARM UNIVERSAL

## (undated) DEVICE — POSITIONER HANA PATIENT CARE KIT POSITION SUPINE

## (undated) DEVICE — POSITIONER STIRRUP STRAP W SLIP RING 19X3.5"

## (undated) DEVICE — HOOD FLYTE STRYKER HELMET SHIELD

## (undated) DEVICE — ELCTR AQUAMANTYS BIPOLAR SEALER 6.0

## (undated) DEVICE — DRAPE MAYO STAND 23"

## (undated) DEVICE — SOL IRR BAG NS 0.9% 3000ML

## (undated) DEVICE — VENODYNE/SCD SLEEVE CALF MEDIUM

## (undated) DEVICE — SYR LUER LOK 20CC

## (undated) DEVICE — SOLIDIFIER CANN EXPRESS 3K

## (undated) DEVICE — DRAPE IOBAN 23" X 23"

## (undated) DEVICE — DRAPE TOP SHEET 53" X 101"